# Patient Record
Sex: MALE | Race: WHITE | URBAN - METROPOLITAN AREA
[De-identification: names, ages, dates, MRNs, and addresses within clinical notes are randomized per-mention and may not be internally consistent; named-entity substitution may affect disease eponyms.]

---

## 2017-03-20 ENCOUNTER — INPATIENT (INPATIENT)
Facility: HOSPITAL | Age: 67
LOS: 0 days | Discharge: HOME | DRG: 310 | End: 2017-03-21
Attending: INTERNAL MEDICINE | Admitting: INTERNAL MEDICINE
Payer: COMMERCIAL

## 2017-03-20 VITALS
DIASTOLIC BLOOD PRESSURE: 83 MMHG | HEART RATE: 94 BPM | RESPIRATION RATE: 20 BRPM | WEIGHT: 130.07 LBS | TEMPERATURE: 98 F | OXYGEN SATURATION: 98 % | SYSTOLIC BLOOD PRESSURE: 135 MMHG

## 2017-03-20 DIAGNOSIS — Z02.9 ENCOUNTER FOR ADMINISTRATIVE EXAMINATIONS, UNSPECIFIED: ICD-10-CM

## 2017-03-20 DIAGNOSIS — E78.00 PURE HYPERCHOLESTEROLEMIA, UNSPECIFIED: ICD-10-CM

## 2017-03-20 DIAGNOSIS — I48.91 UNSPECIFIED ATRIAL FIBRILLATION: ICD-10-CM

## 2017-03-20 DIAGNOSIS — Z41.8 ENCOUNTER FOR OTHER PROCEDURES FOR PURPOSES OTHER THAN REMEDYING HEALTH STATE: ICD-10-CM

## 2017-03-20 DIAGNOSIS — E03.9 HYPOTHYROIDISM, UNSPECIFIED: ICD-10-CM

## 2017-03-20 DIAGNOSIS — R63.8 OTHER SYMPTOMS AND SIGNS CONCERNING FOOD AND FLUID INTAKE: ICD-10-CM

## 2017-03-20 LAB
ALBUMIN SERPL ELPH-MCNC: 3.7 G/DL — SIGNIFICANT CHANGE UP (ref 3.4–5)
ALP SERPL-CCNC: 66 U/L — SIGNIFICANT CHANGE UP (ref 40–120)
ALT FLD-CCNC: 34 U/L — SIGNIFICANT CHANGE UP (ref 12–42)
ANION GAP SERPL CALC-SCNC: 9 MMOL/L — SIGNIFICANT CHANGE UP (ref 9–16)
APTT BLD: 33.4 SEC — SIGNIFICANT CHANGE UP (ref 27.5–37.4)
APTT BLD: 81.3 SEC — HIGH (ref 27.5–37.4)
AST SERPL-CCNC: 19 U/L — SIGNIFICANT CHANGE UP (ref 15–37)
BASOPHILS NFR BLD AUTO: 0.3 % — SIGNIFICANT CHANGE UP (ref 0–2)
BILIRUB SERPL-MCNC: 0.7 MG/DL — SIGNIFICANT CHANGE UP (ref 0.2–1.2)
BUN SERPL-MCNC: 21 MG/DL — SIGNIFICANT CHANGE UP (ref 7–23)
CALCIUM SERPL-MCNC: 9.9 MG/DL — SIGNIFICANT CHANGE UP (ref 8.5–10.5)
CHLORIDE SERPL-SCNC: 103 MMOL/L — SIGNIFICANT CHANGE UP (ref 96–108)
CK MB CFR SERPL CALC: <1 NG/ML — SIGNIFICANT CHANGE UP (ref 0.5–3.6)
CK MB CFR SERPL CALC: <1 NG/ML — SIGNIFICANT CHANGE UP (ref 0.5–3.6)
CK SERPL-CCNC: 26 U/L — LOW (ref 39–308)
CK SERPL-CCNC: 40 U/L — SIGNIFICANT CHANGE UP (ref 39–308)
CO2 SERPL-SCNC: 27 MMOL/L — SIGNIFICANT CHANGE UP (ref 22–31)
CREAT SERPL-MCNC: 0.94 MG/DL — SIGNIFICANT CHANGE UP (ref 0.5–1.3)
EOSINOPHIL NFR BLD AUTO: 1.5 % — SIGNIFICANT CHANGE UP (ref 0–6)
EXTRA SST TUBE: SIGNIFICANT CHANGE UP
GLUCOSE SERPL-MCNC: 98 MG/DL — SIGNIFICANT CHANGE UP (ref 70–99)
HBA1C BLD-MCNC: 5.9 % — HIGH (ref 4.8–5.6)
HCT VFR BLD CALC: 49.2 % — SIGNIFICANT CHANGE UP (ref 39–50)
HGB BLD-MCNC: 16.8 G/DL — SIGNIFICANT CHANGE UP (ref 13–17)
INR BLD: 1.19 — HIGH (ref 0.88–1.16)
LYMPHOCYTES # BLD AUTO: 21.9 % — SIGNIFICANT CHANGE UP (ref 13–44)
MAGNESIUM SERPL-MCNC: 2.3 MG/DL — SIGNIFICANT CHANGE UP (ref 1.6–2.4)
MCHC RBC-ENTMCNC: 27.9 PG — SIGNIFICANT CHANGE UP (ref 27–34)
MCHC RBC-ENTMCNC: 34.1 G/DL — SIGNIFICANT CHANGE UP (ref 32–36)
MCV RBC AUTO: 81.7 FL — SIGNIFICANT CHANGE UP (ref 80–100)
MONOCYTES NFR BLD AUTO: 10.2 % — SIGNIFICANT CHANGE UP (ref 2–14)
NEUTROPHILS NFR BLD AUTO: 66.1 % — SIGNIFICANT CHANGE UP (ref 43–77)
PHOSPHATE SERPL-MCNC: 3.4 MG/DL — SIGNIFICANT CHANGE UP (ref 2.5–4.5)
PLATELET # BLD AUTO: 319 K/UL — SIGNIFICANT CHANGE UP (ref 150–400)
POTASSIUM SERPL-MCNC: 4.3 MMOL/L — SIGNIFICANT CHANGE UP (ref 3.5–5.3)
POTASSIUM SERPL-SCNC: 4.3 MMOL/L — SIGNIFICANT CHANGE UP (ref 3.5–5.3)
PROT SERPL-MCNC: 7.9 G/DL — SIGNIFICANT CHANGE UP (ref 6.4–8.2)
PROTHROM AB SERPL-ACNC: 13.2 SEC — HIGH (ref 10–13.1)
RBC # BLD: 6.02 M/UL — HIGH (ref 4.2–5.8)
RBC # FLD: 12.9 % — SIGNIFICANT CHANGE UP (ref 10.3–16.9)
SODIUM SERPL-SCNC: 139 MMOL/L — SIGNIFICANT CHANGE UP (ref 135–145)
T4 AB SER-ACNC: 17.18 UG/DL — HIGH (ref 3.17–11.72)
TROPONIN I SERPL-MCNC: <0.015 NG/ML — SIGNIFICANT CHANGE UP (ref 0.01–0.04)
TROPONIN I SERPL-MCNC: <0.015 NG/ML — SIGNIFICANT CHANGE UP (ref 0.01–0.04)
TSH SERPL-MCNC: 0.01 UIU/ML — LOW (ref 0.35–4.94)
WBC # BLD: 9.3 K/UL — SIGNIFICANT CHANGE UP (ref 3.8–10.5)
WBC # FLD AUTO: 9.3 K/UL — SIGNIFICANT CHANGE UP (ref 3.8–10.5)

## 2017-03-20 PROCEDURE — 93010 ELECTROCARDIOGRAM REPORT: CPT | Mod: 59

## 2017-03-20 PROCEDURE — 93010 ELECTROCARDIOGRAM REPORT: CPT | Mod: 77,59

## 2017-03-20 PROCEDURE — 99291 CRITICAL CARE FIRST HOUR: CPT | Mod: 25

## 2017-03-20 PROCEDURE — 93306 TTE W/DOPPLER COMPLETE: CPT | Mod: 26

## 2017-03-20 PROCEDURE — 71010: CPT | Mod: 26

## 2017-03-20 PROCEDURE — 99255 IP/OBS CONSLTJ NEW/EST HI 80: CPT

## 2017-03-20 RX ORDER — HEPARIN SODIUM 5000 [USP'U]/ML
2000 INJECTION INTRAVENOUS; SUBCUTANEOUS EVERY 6 HOURS
Qty: 0 | Refills: 0 | Status: DISCONTINUED | OUTPATIENT
Start: 2017-03-20 | End: 2017-03-20

## 2017-03-20 RX ORDER — SODIUM CHLORIDE 9 MG/ML
1000 INJECTION INTRAMUSCULAR; INTRAVENOUS; SUBCUTANEOUS ONCE
Qty: 0 | Refills: 0 | Status: COMPLETED | OUTPATIENT
Start: 2017-03-20 | End: 2017-03-20

## 2017-03-20 RX ORDER — HEPARIN SODIUM 5000 [USP'U]/ML
4500 INJECTION INTRAVENOUS; SUBCUTANEOUS ONCE
Qty: 0 | Refills: 0 | Status: COMPLETED | OUTPATIENT
Start: 2017-03-20 | End: 2017-03-20

## 2017-03-20 RX ORDER — PANTOPRAZOLE SODIUM 20 MG/1
40 TABLET, DELAYED RELEASE ORAL
Qty: 0 | Refills: 0 | Status: DISCONTINUED | OUTPATIENT
Start: 2017-03-20 | End: 2017-03-21

## 2017-03-20 RX ORDER — HEPARIN SODIUM 5000 [USP'U]/ML
1150 INJECTION INTRAVENOUS; SUBCUTANEOUS
Qty: 25000 | Refills: 0 | Status: DISCONTINUED | OUTPATIENT
Start: 2017-03-20 | End: 2017-03-21

## 2017-03-20 RX ORDER — HEPARIN SODIUM 5000 [USP'U]/ML
1200 INJECTION INTRAVENOUS; SUBCUTANEOUS
Qty: 25000 | Refills: 0 | Status: DISCONTINUED | OUTPATIENT
Start: 2017-03-20 | End: 2017-03-20

## 2017-03-20 RX ORDER — ASPIRIN/CALCIUM CARB/MAGNESIUM 324 MG
81 TABLET ORAL DAILY
Qty: 0 | Refills: 0 | Status: DISCONTINUED | OUTPATIENT
Start: 2017-03-20 | End: 2017-03-21

## 2017-03-20 RX ORDER — HEPARIN SODIUM 5000 [USP'U]/ML
1100 INJECTION INTRAVENOUS; SUBCUTANEOUS
Qty: 25000 | Refills: 0 | Status: DISCONTINUED | OUTPATIENT
Start: 2017-03-20 | End: 2017-03-20

## 2017-03-20 RX ORDER — ATORVASTATIN CALCIUM 80 MG/1
20 TABLET, FILM COATED ORAL AT BEDTIME
Qty: 0 | Refills: 0 | Status: DISCONTINUED | OUTPATIENT
Start: 2017-03-20 | End: 2017-03-21

## 2017-03-20 RX ORDER — SODIUM CHLORIDE 9 MG/ML
1000 INJECTION INTRAMUSCULAR; INTRAVENOUS; SUBCUTANEOUS
Qty: 0 | Refills: 0 | Status: DISCONTINUED | OUTPATIENT
Start: 2017-03-20 | End: 2017-03-20

## 2017-03-20 RX ORDER — HEPARIN SODIUM 5000 [USP'U]/ML
4500 INJECTION INTRAVENOUS; SUBCUTANEOUS EVERY 6 HOURS
Qty: 0 | Refills: 0 | Status: DISCONTINUED | OUTPATIENT
Start: 2017-03-20 | End: 2017-03-20

## 2017-03-20 RX ORDER — HEPARIN SODIUM 5000 [USP'U]/ML
INJECTION INTRAVENOUS; SUBCUTANEOUS
Qty: 25000 | Refills: 0 | Status: DISCONTINUED | OUTPATIENT
Start: 2017-03-20 | End: 2017-03-20

## 2017-03-20 RX ORDER — LEVOTHYROXINE SODIUM 125 MCG
150 TABLET ORAL DAILY
Qty: 0 | Refills: 0 | Status: DISCONTINUED | OUTPATIENT
Start: 2017-03-21 | End: 2017-03-21

## 2017-03-20 RX ADMIN — HEPARIN SODIUM 4500 UNIT(S): 5000 INJECTION INTRAVENOUS; SUBCUTANEOUS at 13:25

## 2017-03-20 RX ADMIN — PANTOPRAZOLE SODIUM 40 MILLIGRAM(S): 20 TABLET, DELAYED RELEASE ORAL at 19:50

## 2017-03-20 RX ADMIN — SODIUM CHLORIDE 125 MILLILITER(S): 9 INJECTION INTRAMUSCULAR; INTRAVENOUS; SUBCUTANEOUS at 14:15

## 2017-03-20 RX ADMIN — HEPARIN SODIUM 11 UNIT(S)/HR: 5000 INJECTION INTRAVENOUS; SUBCUTANEOUS at 21:29

## 2017-03-20 RX ADMIN — HEPARIN SODIUM 1100 UNIT(S)/HR: 5000 INJECTION INTRAVENOUS; SUBCUTANEOUS at 13:27

## 2017-03-20 RX ADMIN — Medication 81 MILLIGRAM(S): at 19:51

## 2017-03-20 RX ADMIN — SODIUM CHLORIDE 2000 MILLILITER(S): 9 INJECTION INTRAMUSCULAR; INTRAVENOUS; SUBCUTANEOUS at 12:58

## 2017-03-20 RX ADMIN — ATORVASTATIN CALCIUM 20 MILLIGRAM(S): 80 TABLET, FILM COATED ORAL at 21:31

## 2017-03-20 NOTE — ED ADULT TRIAGE NOTE - CHIEF COMPLAINT QUOTE
"My doctor said I have Afib and come straight to the ER." c/o sob and quotes "I just climbed as 1 flight of stairs." Pt denies any chest pain or dizziness.

## 2017-03-20 NOTE — ED PROVIDER NOTE - OBJECTIVE STATEMENT
68 yo male h/o hld,  hypothyroidism on meds w/o recent changes sent by his cardiologist Dr Staley and found to have rapid afib 160's in the office. Pt went to cardiologist for routine visit today.  Pt c/o fatigue x ~ 1 mo, noted sob w climbing stairs today but no h/o de guzman.  No cp, palpitations.  No h/o afib.  Per pt, hr 90's on pulse ox machine at home over the weekend.  No other complaint.

## 2017-03-20 NOTE — CONSULT NOTE ADULT - ATTENDING COMMENTS
A Fib of unknown duration most likely due to iatrogenic hyperthyroidism.  Hold Synthroid and have Endocrine consult(Dennys or Keila).  RAMON and DC CV in light of reduced EF. Pt may not convert due to ongoing hyperthyroidism.  Beta blocker would be better due to hyperthyroidism(over a Ca++ channel blocker) for rate control.  Possible DC on Xarelto(Wife takes it as well as she also has A Fib)    Exam notable for severe scoliosis

## 2017-03-20 NOTE — H&P ADULT - PROBLEM SELECTOR PLAN 1
Pt presents with new onset Afib, unknown time of onset. 's in ED s/p Cardizem 20 mg IV and 30 mg PO with improvement in HR to 90's.  - TSH very low 0.009- likely over medicated on Synthroid and possible etiology of rapid afib. f/u Free T3, T4.  On synthroid 200 mcg at home. Will reduce to 150 mcg daily.  - Trops negative x 1 and patient chest pain free. Continue to trend cardiac enzymes to r/o ischemia.   - f/u Echo to assess EF, regional wall motion abnormalities or significant valvular disease  - EP consulted; give new onset Afib, patient will likely benefit from DCCV  - Continue rate control with Cardizem 30 mg every 6 hours  - [id number] 1.  Given possibility of DCCV, continue with heparin gtt. f/u PTT (goal 60-80). Guiac negative in ED.  Patient is a good candidate for NOAC if insurance covers the drug.

## 2017-03-20 NOTE — ED PROVIDER NOTE - CONSTITUTIONAL, MLM
normal... Well appearing, thin, awake, alert, oriented to person, place, time/situation and in no apparent distress.

## 2017-03-20 NOTE — CONSULT NOTE ADULT - SUBJECTIVE AND OBJECTIVE BOX
CHIEF COMPLAINT: ESPINOZA     HISTORY OF PRESENT ILLNESS: 68 y/o M with h/o severe scoliosis, HTN, HLD who presented to cardiologist (Dr. Staley) today for evaluation of worsening ESPINOZA x 2-3 months with minimal exertion.  He also endorses fatigue and intermittent palpitations over the last few weeks.  He denies any active chest discomfort, SOB at rest, orthopnea, presyncope or syncope.  Found to be in rapid AF and sent to ED.  Received 1L NS bolus, Cardizem 20 mg IV x1 and Cardizem 30 mg PO with improvement in HR 90-110s.    PAST MEDICAL & SURGICAL HISTORY:  Scoliosis  Hypercholesteremia  Hypothyroid        PERTINENT DIAGNOSTIC TESTING:    [ ] Echocardiogram:      Allergies    Aleve (Unknown)  Sudafed (Unknown)    Intolerances    	    MEDICATIONS:  diltiazem    Tablet 30milliGRAM(s) Oral every 6 hours    pantoprazole    Tablet 40milliGRAM(s) Oral before breakfast    atorvastatin 20milliGRAM(s) Oral at bedtime    heparin  Infusion 1100Unit(s)/Hr IV Continuous <Continuous>      FAMILY HISTORY:  Father  MI 55    SOCIAL HISTORY:    [x ] Non-smoker  [ x] Social Alcohol  No illicits        REVIEW OF SYSTEMS:        [x ] All others negative except for as noted in HPI      PHYSICAL EXAM:  T(C): 36, Max: 36.4 ( @ 12:31)  HR: 110 (79 - 175)  BP: 102/71 (102/71 - 152/87)  RR: 18 (18 - 20)  SpO2: 97% (96% - 98%)  Wt(kg): --  I&O's Summary      TELEMETRY: 	  AF @ ~ 110 bpm  ECG: AF with RVR    Appearance: Normal	  HEENT:   Normal oral mucosa, PERRL, EOMI	  Cardiovascular: s1s2 irreg irreg tachy  Respiratory: Lungs CTA b/l, diminished RLL	  Gastrointestinal:  Soft, Non-tender, + BS	  Neurologic: A&O x 3, Non-focal  Extremities: Normal range of motion, No clubbing, cyanosis or edema  Vascular: Peripheral pulses palpable 2+ bilaterally    	  LABS:	 	    CARDIAC MARKERS:  Troponin I, Serum: <0.015 ng/mL ( @ 12:52)                                  16.8   9.3   )-----------( 319      ( 20 Mar 2017 12:52 )             49.2     20 Mar 2017 12:52    139    |  103    |  21     ----------------------------<  98     4.3     |  27     |  0.94     Ca    9.9        20 Mar 2017 12:52  Phos  3.4       20 Mar 2017 12:52  Mg     2.3       20 Mar 2017 12:52    TPro  7.9    /  Alb  3.7    /  TBili  0.7    /  DBili  x      /  AST  19     /  ALT  34     /  AlkPhos  66     20 Mar 2017 12:52    proBNP:   Lipid Profile:   HgA1c: Hemoglobin A1C, Whole Blood: 5.9 % ( @ 12:52)    TSH: Thyroid Stimulating Hormone, Serum: 0.009 uIU/mL ( @ 12:52)      ASSESSMENT/PLAN: 	  68 y/o M with newly diagnosed symptomatic atrial fibrillation with RVR  - NPO after MN for RAMON/DCCV  - Continue rate control as you are   - Agree with reduced dose Levothyroxine given TSH low  - Pt. verbalizes intent to f/u with Dr. Layton at [facility] as his wife has established arrhythmia care with her  - Recommend Xarelto 20 mg PO daily for stroke prophylaxis

## 2017-03-20 NOTE — H&P ADULT - NSHPLABSRESULTS_GEN_ALL_CORE
LABS:                          16.8   9.3   )-----------( 319      ( 20 Mar 2017 12:52 )             49.2                              20 Mar 2017 12:52    139    |  103    |  21     ----------------------------<  98     4.3     |  27     |  0.94     Ca    9.9        20 Mar 2017 12:52  Phos  3.4       20 Mar 2017 12:52  Mg     2.3       20 Mar 2017 12:52    TPro  7.9    /  Alb  3.7    /  TBili  0.7    /  DBili  x      /  AST  19     /  ALT  34     /  AlkPhos  66     20 Mar 2017 12:52    LIVER FUNCTIONS - ( 20 Mar 2017 12:52 )  Alb: 3.7 g/dL / Pro: 7.9 g/dL / ALK PHOS: 66 U/L / ALT: 34 U/L / AST: 19 U/L / GGT: x                                 PT/INR - ( 20 Mar 2017 12:52 )   PT: 13.2 sec;   INR: 1.19          PTT - ( 20 Mar 2017 12:52 )  PTT:33.4 sec  CAPILLARY BLOOD GLUCOSE    CARDIAC MARKERS ( 20 Mar 2017 12:52 )  <0.015 ng/mL / x     / 40 U/L / x     / <1.0 ng/mL            Aleve (Unknown)  Sudafed (Unknown)    MEDICATIONS  (STANDING):  atorvastatin 20milliGRAM(s) Oral at bedtime  diltiazem    Tablet 30milliGRAM(s) Oral every 6 hours  pantoprazole    Tablet 40milliGRAM(s) Oral before breakfast  heparin  Infusion 1100Unit(s)/Hr IV Continuous <Continuous>    MEDICATIONS  (PRN):

## 2017-03-20 NOTE — ED PROVIDER NOTE - MEDICAL DECISION MAKING DETAILS
Pt sent for rapid afib w rapid afib on arrival. Pt given cardizem iv and po w improved hr.  Pt discussed w his cardiologist, Dr. Staley - merlin to hospitalist cardiology.  Heparin started. Enzymes neg. Pt discussed w Dr Epperson and agrees to admit.

## 2017-03-20 NOTE — ED PROVIDER NOTE - MUSCULOSKELETAL, MLM
no c/c/e/ttp bilat le, Spine appears normal, range of motion is not limited, no muscle or joint tenderness

## 2017-03-20 NOTE — ED PROVIDER NOTE - CRITICAL CARE PROVIDED
interpretation of diagnostic studies/consult w/ pt's family directly relating to pts condition/documentation/direct patient care (not related to procedure)/additional history taking/consultation with other physicians

## 2017-03-20 NOTE — H&P ADULT - NSHPPHYSICALEXAM_GEN_ALL_CORE
VITAL SIGNS:  T(C): 36, Max: 36.4 (03-20 @ 12:31)  HR: 110 (79 - 175)  BP: 102/71 (102/71 - 152/87)  RR: 18 (18 - 20)  SpO2: 97% (96% - 98%)  Wt(kg): --    I&O's Summary        PHYSICAL EXAM:    General: A/ox 3, No acute Distress, Severe scoliosis  Neck: Supple, NO JVD  Cardiac: S1 S2, irregularly irregular, tachycardic  Pulmonary: CTAB, Breathing unlabored, No Rhonchi/Rales/Wheezing  Abdomen: Soft, Non -tender, +BS x 4 quads  Extremities: No Rashes, No edema  Neuro: A/o x 3, No focal deficits VITAL SIGNS:  T(C): 36, Max: 36.4 (03-20 @ 12:31)  HR: 110 (79 - 175)  BP: 102/71 (102/71 - 152/87)  RR: 18 (18 - 20)  SpO2: 97% (96% - 98%)  Wt(kg): --    I&O's Summary        PHYSICAL EXAM:    General: A/ox 3, No acute Distress, + scoliosis  Neck: Supple, NO JVD  Cardiac: S1 S2, irregularly irregular, tachycardic  Pulmonary: CTAB, Breathing unlabored, No Rhonchi/Rales/Wheezing  Abdomen: Soft, Non -tender, +BS x 4 quads  Extremities: No Rashes, No edema  Neuro: A/o x 3, No focal deficits

## 2017-03-20 NOTE — H&P ADULT - NSHPREVIEWOFSYSTEMS_GEN_ALL_CORE
GeneraL: + Fatigue  Pulmonary: ESPINOZA with walking one flight of stairs  Cardiac: Denies chest pain, +intermittent palpitations, ESPINOZA  GI: Reports frequently BM's intermittently last week, no diarrhea, no melena, BRBPR  Extremities: Denies claudication, lower extremity weakness.

## 2017-03-20 NOTE — H&P ADULT - HISTORY OF PRESENT ILLNESS
66 y/o male with pmhx of Severe Scoliosis with pulmonary limitations, Hypothyroidism and Hyperlipidemia was sent in by his Cardiologist Dr. Staley for  new onset Afib. Patient states he has been feeling extreme fatigue and ESPINOZA with one flight of stairs for the last couple of months but progressively worsening in the last 3 weeks. He also c/o intermittent palpitations but denies chest pain, dizziness, orthopnea, PND, recent illness, travel.  Denies any recent changes in medications. Denies Tobacco dependance, ETOH or illicit drugs.  He was seen by Dr. Staley in the office today and found to be in rapid Afib for which he was sent to the ED.  Family history significant for Father dying of MI at age 55.  Last ischemic evaluation was last year which was a nuclear stress test reportedly normal. 66 y/o male with pmhx of Severe Scoliosis with pulmonary limitations, Hypothyroidism and Hyperlipidemia was sent in by his Cardiologist Dr. Staley for  new onset Afib. Patient states he has been feeling extreme fatigue and ESPINOZA with one flight of stairs for the last couple of months but progressively worsening in the last 3 weeks. He also c/o intermittent palpitations but denies chest pain, dizziness, orthopnea, PND, recent illness, travel.  Denies any recent changes in medications. Denies Tobacco dependance, ETOH or illicit drugs.  He was seen by Dr. Staley in the office today and found to be in rapid Afib for which he was sent to the ED.  Family history significant for Father dying of MI at age 55.  Last ischemic evaluation was last year which was a nuclear stress test reportedly normal.     In the Ed, /71 to 123/83, 's initially improved to 90s' after Cardizem, HR 16-18, O2 sat 96% on RA  Patient was given 1 liter fluid bolus, Cardizem 20 mg IV x 1 and 30 mg PO x 1. 66 y/o male with pmhx of Scoliosis  Hypothyroidism and Hyperlipidemia was sent in by his Cardiologist Dr. Staley for  new onset Afib. Patient states he has been feeling extreme fatigue and ESPINOZA with one flight of stairs for the last couple of months but progressively worsening in the last 3 weeks. He also c/o intermittent palpitations but denies chest pain, dizziness, orthopnea, PND, recent illness, travel.  Denies any recent changes in medications. Denies Tobacco dependance, ETOH or illicit drugs.  He was seen by Dr. Staley in the office today and found to be in rapid Afib for which he was sent to the ED.  Family history significant for Father dying of MI at age 55.  Last ischemic evaluation was last year which was a nuclear stress test reportedly normal.     In the Ed, /71 to 123/83, 's initially improved to 90s' after Cardizem, HR 16-18, O2 sat 96% on RA  Patient was given 1 liter fluid bolus, Cardizem 20 mg IV x 1 and 30 mg PO x 1.

## 2017-03-20 NOTE — ED ADULT NURSE NOTE - OBJECTIVE STATEMENT
67 year old male generalized weakness x weeks and sob when climbing up the stairs. Pt reported was at his cardiologist when EKG was done and was in new onset of Afib. Denies chest pain or dizziness at he moment. 67 year old male generalized weakness x weeks and sob when climbing up the stairs. Pt reported was at his cardiologist when EKG was done and was in new onset of Afib. Denies chest pain or dizziness at the moment.

## 2017-03-20 NOTE — H&P ADULT - ASSESSMENT
66 y/o male with pmhx of Scoliosis, Hypothyroidism and Hypertension presents with 3 weeks of fatigue and ESPINOZA; found to be in new onset Afib.

## 2017-03-21 ENCOUNTER — TRANSCRIPTION ENCOUNTER (OUTPATIENT)
Age: 67
End: 2017-03-21

## 2017-03-21 VITALS — RESPIRATION RATE: 16 BRPM | SYSTOLIC BLOOD PRESSURE: 112 MMHG | DIASTOLIC BLOOD PRESSURE: 77 MMHG | HEART RATE: 98 BPM

## 2017-03-21 LAB
APTT BLD: 40.4 SEC — HIGH (ref 27.5–37.4)
T3 SERPL-MCNC: 163 NG/DL — SIGNIFICANT CHANGE UP (ref 80–200)

## 2017-03-21 PROCEDURE — 96375 TX/PRO/DX INJ NEW DRUG ADDON: CPT

## 2017-03-21 PROCEDURE — 85730 THROMBOPLASTIN TIME PARTIAL: CPT

## 2017-03-21 PROCEDURE — 84443 ASSAY THYROID STIM HORMONE: CPT

## 2017-03-21 PROCEDURE — 80061 LIPID PANEL: CPT

## 2017-03-21 PROCEDURE — 71045 X-RAY EXAM CHEST 1 VIEW: CPT

## 2017-03-21 PROCEDURE — 84100 ASSAY OF PHOSPHORUS: CPT

## 2017-03-21 PROCEDURE — 83735 ASSAY OF MAGNESIUM: CPT

## 2017-03-21 PROCEDURE — 92960 CARDIOVERSION ELECTRIC EXT: CPT

## 2017-03-21 PROCEDURE — 93312 ECHO TRANSESOPHAGEAL: CPT

## 2017-03-21 PROCEDURE — 84484 ASSAY OF TROPONIN QUANT: CPT

## 2017-03-21 PROCEDURE — 93320 DOPPLER ECHO COMPLETE: CPT | Mod: 26

## 2017-03-21 PROCEDURE — 85025 COMPLETE CBC W/AUTO DIFF WBC: CPT

## 2017-03-21 PROCEDURE — 84436 ASSAY OF TOTAL THYROXINE: CPT

## 2017-03-21 PROCEDURE — 84481 FREE ASSAY (FT-3): CPT

## 2017-03-21 PROCEDURE — 84439 ASSAY OF FREE THYROXINE: CPT

## 2017-03-21 PROCEDURE — 83036 HEMOGLOBIN GLYCOSYLATED A1C: CPT

## 2017-03-21 PROCEDURE — 99291 CRITICAL CARE FIRST HOUR: CPT | Mod: 25

## 2017-03-21 PROCEDURE — 82550 ASSAY OF CK (CPK): CPT

## 2017-03-21 PROCEDURE — 80053 COMPREHEN METABOLIC PANEL: CPT

## 2017-03-21 PROCEDURE — 96374 THER/PROPH/DIAG INJ IV PUSH: CPT

## 2017-03-21 PROCEDURE — 93005 ELECTROCARDIOGRAM TRACING: CPT

## 2017-03-21 PROCEDURE — 93312 ECHO TRANSESOPHAGEAL: CPT | Mod: 26

## 2017-03-21 PROCEDURE — 84480 ASSAY TRIIODOTHYRONINE (T3): CPT

## 2017-03-21 PROCEDURE — 85027 COMPLETE CBC AUTOMATED: CPT

## 2017-03-21 PROCEDURE — 82553 CREATINE MB FRACTION: CPT

## 2017-03-21 PROCEDURE — 85610 PROTHROMBIN TIME: CPT

## 2017-03-21 PROCEDURE — 93010 ELECTROCARDIOGRAM REPORT: CPT

## 2017-03-21 PROCEDURE — 36415 COLL VENOUS BLD VENIPUNCTURE: CPT

## 2017-03-21 PROCEDURE — 93306 TTE W/DOPPLER COMPLETE: CPT

## 2017-03-21 PROCEDURE — 93325 DOPPLER ECHO COLOR FLOW MAPG: CPT | Mod: 26

## 2017-03-21 RX ORDER — LEVOTHYROXINE SODIUM 125 MCG
1 TABLET ORAL
Qty: 0 | Refills: 0 | COMMUNITY

## 2017-03-21 RX ORDER — HEPARIN SODIUM 5000 [USP'U]/ML
900 INJECTION INTRAVENOUS; SUBCUTANEOUS
Qty: 25000 | Refills: 0 | Status: DISCONTINUED | OUTPATIENT
Start: 2017-03-21 | End: 2017-03-21

## 2017-03-21 RX ORDER — RIVAROXABAN 15 MG-20MG
1 KIT ORAL
Qty: 30 | Refills: 0 | OUTPATIENT
Start: 2017-03-21 | End: 2017-04-20

## 2017-03-21 RX ORDER — LEVOTHYROXINE SODIUM 125 MCG
1 TABLET ORAL
Qty: 30 | Refills: 0 | OUTPATIENT
Start: 2017-03-21 | End: 2017-04-20

## 2017-03-21 RX ORDER — METOPROLOL TARTRATE 50 MG
1 TABLET ORAL
Qty: 30 | Refills: 0 | OUTPATIENT
Start: 2017-03-21 | End: 2017-04-20

## 2017-03-21 RX ORDER — RIVAROXABAN 15 MG-20MG
20 KIT ORAL
Qty: 0 | Refills: 0 | Status: DISCONTINUED | OUTPATIENT
Start: 2017-03-21 | End: 2017-03-21

## 2017-03-21 RX ORDER — RIVAROXABAN 15 MG-20MG
1 KIT ORAL
Qty: 0 | Refills: 0 | COMMUNITY
Start: 2017-03-21

## 2017-03-21 RX ADMIN — Medication 150 MICROGRAM(S): at 07:23

## 2017-03-21 RX ADMIN — Medication 81 MILLIGRAM(S): at 15:34

## 2017-03-21 RX ADMIN — PANTOPRAZOLE SODIUM 40 MILLIGRAM(S): 20 TABLET, DELAYED RELEASE ORAL at 07:23

## 2017-03-21 RX ADMIN — RIVAROXABAN 20 MILLIGRAM(S): KIT at 16:32

## 2017-03-21 NOTE — PROGRESS NOTE ADULT - PROBLEM SELECTOR PLAN 2
TSH very low.  f/u free T3, T4  - Dr. Noyola consulted; per recs, hold Synthroid for now and patient can follow up with in the office next Wednesday, March 29th at 4 pm at which point it'll likely be restarted at a lower dose. If patient cannot follow up on this date, will d/c on Synthroid 137 mcg daily per recs.

## 2017-03-21 NOTE — DISCHARGE NOTE ADULT - PLAN OF CARE
You were admitted for an abnormal heart rhythm called atrial fibrillation or Afib. In people with Afib, the electrical signals that control the heart rate are abnormal. As a result, the top two chambers of the heart stop pumping effectively. and  a small amount of blood gets left behind. As the blood pools, it can start to form clots and these clots can travel through the blood vessels and cause strokes. You were evaluated by the Electrophysiology team and they tried to attempt a procedure called cardioversion to restore normal rhythm.  However, this did not work and you reverted back to Afib within a short period of time. You have been started on a medication called Metoprolol to help control the heart rate and a blood thinner called Xarelto prevent the risk of stroke; please take them as prescribed.  Follow up with Dr. Epperson or your own Cardiologist within one week of discharge. Your Afib is driven by overactive thyroid as you were overmedicated with Synthroid.  Please follow the directions below. You were overtreated for hypothyroidism and your thyroid functions tests very abnormal.  You were evaluated by an Endocrinologist and they made the recommendation to hold your levothyroxine for one week and follow up with Dr. Noyola on Wednesday, March 29th.  She will likely restart a lower dose of Synthroid 137 mcg at that time.  The prescription has been sent to your pharmacy but you should discuss with her before starting this new dose. Heart rate control and prevent risk of stroke. You were admitted for an abnormal heart rhythm called atrial fibrillation or Afib. In people with Afib, the electrical signals that control the heart rate are abnormal. As a result, the top two chambers of the heart stop pumping effectively. and  a small amount of blood gets left behind. As the blood pools, it can start to form clots and these clots can travel through the blood vessels and cause strokes. You were evaluated by the Electrophysiology team and they tried to attempt a procedure called cardioversion to restore normal rhythm.  However, this did not work and you reverted back to Afib within a short period of time. You have been started on a medication called Metoprolol to help control the heart rate and a blood thinner called Xarelto prevent the risk of stroke; please take them as prescribed.  Follow up with Dr. Epperson or your own Cardiologist within one week of discharge. Your Afib is driven by overactive thyroid as you were overmedicated with Levothyroxine.  Please follow the directions below.

## 2017-03-21 NOTE — DISCHARGE NOTE ADULT - MEDICATION SUMMARY - MEDICATIONS TO TAKE
I will START or STAY ON the medications listed below when I get home from the hospital:    rivaroxaban 20 mg oral tablet  -- 1 tab(s) by mouth once a day (before a meal)  -- Indication: For Atrial fibrillation    atorvastatin 20 mg oral tablet  -- 1 tab(s) by mouth once a day  -- Indication: For Hypercholesteremia    Toprol-XL 50 mg oral tablet, extended release  -- 1 tab(s) by mouth once a day. Start on 3/21.  -- It is very important that you take or use this exactly as directed.  Do not skip doses or discontinue unless directed by your doctor.  May cause drowsiness.  Alcohol may intensify this effect.  Use care when operating dangerous machinery.  Some non-prescription drugs may aggravate your condition.  Read all labels carefully.  If a warning appears, check with your doctor before taking.  Swallow whole.  Do not crush.  Take with food or milk.  This drug may impair the ability to drive or operate machinery.  Use care until you become familiar with its effects.    -- Indication: For Atrial fibrillation    levothyroxine 137 mcg (0.137 mg) oral tablet  -- 1 tab(s) by mouth once a day. Please start on 3/29 after discussing with Dr. Noyola.  -- It is very important that you take or use this exactly as directed.  Do not skip doses or discontinue unless directed by your doctor.  Medication should be taken with plenty of water.  Some non-prescription drugs may aggravate your condition.  Read all labels carefully.  If a warning appears, check with your doctor before taking.  Take medication on an empty stomach 1 hour before or 2 to 3 hours after a meal unless otherwise directed by your doctor.    -- Indication: For Hypothyroid I will START or STAY ON the medications listed below when I get home from the hospital:    rivaroxaban 20 mg oral tablet  -- 1 tab(s) by mouth once a day (before a meal)  -- Indication: For Atrial fibrillation    atorvastatin 20 mg oral tablet  -- 1 tab(s) by mouth once a day  -- Indication: For Hypercholesteremia    Toprol-XL 50 mg oral tablet, extended release  -- 1 tab(s) by mouth once a day. Start on 3/22.  -- It is very important that you take or use this exactly as directed.  Do not skip doses or discontinue unless directed by your doctor.  May cause drowsiness.  Alcohol may intensify this effect.  Use care when operating dangerous machinery.  Some non-prescription drugs may aggravate your condition.  Read all labels carefully.  If a warning appears, check with your doctor before taking.  Swallow whole.  Do not crush.  Take with food or milk.  This drug may impair the ability to drive or operate machinery.  Use care until you become familiar with its effects.    -- Indication: For Atrial fibrillation    levothyroxine 137 mcg (0.137 mg) oral tablet  -- 1 tab(s) by mouth once a day. Please start on 3/29 after discussing with Dr. Noyola.  -- It is very important that you take or use this exactly as directed.  Do not skip doses or discontinue unless directed by your doctor.  Medication should be taken with plenty of water.  Some non-prescription drugs may aggravate your condition.  Read all labels carefully.  If a warning appears, check with your doctor before taking.  Take medication on an empty stomach 1 hour before or 2 to 3 hours after a meal unless otherwise directed by your doctor.    -- Indication: For Hypothyroid

## 2017-03-21 NOTE — DISCHARGE NOTE ADULT - PATIENT PORTAL LINK FT
“You can access the Follow[employer] Patient Portal, offered by [facility], by registering with the following website: [email or web address]”

## 2017-03-21 NOTE — DISCHARGE NOTE ADULT - HOSPITAL COURSE
66 y/o male with pmhx of Scoliosis  Hypothyroidism and Hyperlipidemia was sent in by his Cardiologist Dr. Staley for  new onset Afib. Patient states he has been feeling extreme fatigue and ESPINOZA with one flight of stairs for the last couple of months but progressively worsening in the last 3 weeks. He also c/o intermittent palpitations but denies chest pain, dizziness, orthopnea, PND, recent illness, travel.  Denies any recent changes in medications. Denies Tobacco dependance, ETOH or illicit drugs.  He was seen by Dr. Staley in the office today and found to be in rapid Afib for which he was sent to the ED.  Family history significant for Father dying of MI at age 55.  Last ischemic evaluation was last year which was a nuclear stress test reportedly normal.     In the Ed, /71 to 123/83, 's initially improved to 90s' after Cardizem, HR 16-18, O2 sat 96% on RA  Patient was given 1 liter fluid bolus, Cardizem 20 mg IV x 1 and 30 mg PO x 1.    Labs were significant for suppressed TSH 0.009 with elevated T4 likely etiology of rapid afib. Patient was transferred to [facility] and started on Cardizem 30 mg every 6 hours titrated up to 60 mg every 6 hours. Echo showed normal wall motion, no valvular abnormalities, EF 45-50% likely rate related as patient was tachycardic throughout exam.  EP consulted and pt underwent RAMON showing no LA thrombus but DCCV was unsuccessful. Heparin gtt d/c';d and patient transitioned to Xarelto. Endocrine consulted for recommendations on synthroid dosing as patient was overtreated for hypothyroidism. Per recs, synthroid to be held x 1 week and follow up with Dr. Carmina Noyola within one week. 66 y/o male with pmhx of Scoliosis  Hypothyroidism and Hyperlipidemia was sent in by his Cardiologist Dr. Staley for  new onset Afib. Patient states he has been feeling extreme fatigue and ESPINOZA with one flight of stairs for the last couple of months but progressively worsening in the last 3 weeks. He also c/o intermittent palpitations but denies chest pain, dizziness, orthopnea, PND, recent illness, travel.  Denies any recent changes in medications. Denies Tobacco dependance, ETOH or illicit drugs.  He was seen by Dr. Staley in the office today and found to be in rapid Afib for which he was sent to the ED.  Family history significant for Father dying of MI at age 55.  Last ischemic evaluation was last year which was a nuclear stress test reportedly normal.     In the Ed, /71 to 123/83, 's initially improved to 90s' after Cardizem, HR 16-18, O2 sat 96% on RA  Patient was given 1 liter fluid bolus, Cardizem 20 mg IV x 1 and 30 mg PO x 1.    Labs were significant for suppressed TSH 0.009 with elevated T4 likely etiology of rapid afib. Patient was transferred to [facility] and started on Cardizem 30 mg every 6 hours titrated up to 60 mg every 6 hours. Echo showed normal wall motion, no valvular abnormalities, EF 45-50% likely rate related as patient was tachycardic throughout exam.  EP consulted and pt underwent RAMON showing no LA thrombus but DCCV was unsuccessful. Heparin gtt d/c';d and patient transitioned to Xarelto. Transitioned to Toprol Xl 50 mg for rate controlEndocrine consulted for recommendations on synthroid dosing as patient was overtreated for hypothyroidism. Per recs, synthroid to be held x 1 week and follow up with Dr. Carmina Noyola within one week. Patient's HR remained stable in low 90's and was cleared for discharge per Dr. Epperson.

## 2017-03-21 NOTE — DISCHARGE NOTE ADULT - MEDICATION SUMMARY - MEDICATIONS TO STOP TAKING
I will STOP taking the medications listed below when I get home from the hospital:    levothyroxine 200 mcg (0.2 mg) oral tablet  -- 1 tab(s) by mouth once a day

## 2017-03-21 NOTE — DISCHARGE NOTE ADULT - ADDITIONAL INSTRUCTIONS
1.  Follow up with Dr. Epperson or your Cardiologist trista one week of discharge.  Dr. Epperson: [phone]    2.  Follow up with Dr. Carmina Noyola on Wednesday March 29th at 4 pm.  [address] [place] [address]  [phone] 1.  Follow up with Dr. Epperson or your Cardiologist within one week of discharge.  Dr. Epperson: [phone]    2.  Follow up with Dr. Carmina Noyola on Wednesday March 29th at 4 pm.  [address] [place] [address]  [phone]

## 2017-03-21 NOTE — DISCHARGE NOTE ADULT - CARE PROVIDERS DIRECT ADDRESSES
[email or web address],DirectAddress_Unknown,[email or web address]

## 2017-03-21 NOTE — PROGRESS NOTE ADULT - SUBJECTIVE AND OBJECTIVE BOX
CARDIOLOGY NP PROGRESS NOTE    Subjective: Patient seen and examined at bedside.  Denies chest pain, dizziness, palpitations or SOB.     Overnight Events:     TELEMETRY: Afib with HR mainly 's.   EKG:      VITAL SIGNS:  T(C): 36.9, Max: 36.9 (03-21 @ 09:19)  HR: 108 (84 - 142)  BP: 117/71 (106/68 - 146/87)  RR: 14 (14 - 18)  SpO2: 96% (95% - 96%)  Wt(kg): --    I&O's Summary  I & Os for 24h ending 21 Mar 2017 07:00  =============================================  IN: 136 ml / OUT: 0 ml / NET: 136 ml    I & Os for current day (as of 21 Mar 2017 14:46)  =============================================  IN: 360 ml / OUT: 200 ml / NET: 160 ml        PHYSICAL EXAM:    General: A/ox 3, No acute Distress, severe scoliosis  Neck: Supple, NO JVD  Cardiac: Irregularly irregular.   Pulmonary: CTAB, Breathing unlabored, No Rhonchi/Rales/Wheezing  Abdomen: Soft, Non -tender, +BS x 4 quads  Extremities: No Rashes, No edema  Neuro: A/o x 3, No focal deficits    Lines:       LABS:                          14.8   8.3   )-----------( 254      ( 21 Mar 2017 08:09 )             43.3                              21 Mar 2017 08:09    138    |  108    |  18     ----------------------------<  87     4.0     |  19     |  0.82     Ca    8.6        21 Mar 2017 08:09  Phos  3.4       20 Mar 2017 12:52  Mg     2.1       21 Mar 2017 08:09    TPro  6.6    /  Alb  3.2    /  TBili  1.1    /  DBili  x      /  AST  15     /  ALT  24     /  AlkPhos  53     21 Mar 2017 08:09    LIVER FUNCTIONS - ( 21 Mar 2017 08:09 )  Alb: 3.2 g/dL / Pro: 6.6 g/dL / ALK PHOS: 53 U/L / ALT: 24 U/L / AST: 15 U/L / GGT: x                                 PT/INR - ( 20 Mar 2017 12:52 )   PT: 13.2 sec;   INR: 1.19          PTT - ( 21 Mar 2017 08:09 )  PTT:93.3 sec  CAPILLARY BLOOD GLUCOSE    CARDIAC MARKERS ( 21 Mar 2017 02:19 )  <0.015 ng/mL / x     / 28 U/L / x     / <1.0 ng/mL  CARDIAC MARKERS ( 20 Mar 2017 20:24 )  <0.015 ng/mL / x     / 26 U/L / x     / <1.0 ng/mL  CARDIAC MARKERS ( 20 Mar 2017 12:52 )  <0.015 ng/mL / x     / 40 U/L / x     / <1.0 ng/mL            Aleve (Unknown)  Sudafed (Unknown)    MEDICATIONS  (STANDING):  atorvastatin 20milliGRAM(s) Oral at bedtime  pantoprazole    Tablet 40milliGRAM(s) Oral before breakfast  aspirin enteric coated 81milliGRAM(s) Oral daily  diltiazem    Tablet 60milliGRAM(s) Oral every 6 hours  heparin  Infusion 900Unit(s)/Hr IV Continuous <Continuous>    MEDICATIONS  (PRN):        DIAGNOSTIC TESTS:

## 2017-03-21 NOTE — DISCHARGE NOTE ADULT - CARE PROVIDER_API CALL
Nazario Epperson), Cardiovascular Disease; Internal Medicine  [address] [place] [address]  Phone: [phone]  Fax: [phone]    Carmina Noyola), EndocrinologyMetabDiabetes; Internal Medicine  [address] [place] [address]  Phone: [phone]  Fax: [phone]

## 2017-03-21 NOTE — DISCHARGE NOTE ADULT - CARE PLAN
Principal Discharge DX:	Atrial fibrillation  Goal:	Heart rate control and prevent risk of stroke.  Instructions for follow-up, activity and diet:	You were admitted for an abnormal heart rhythm called atrial fibrillation or Afib. In people with Afib, the electrical signals that control the heart rate are abnormal. As a result, the top two chambers of the heart stop pumping effectively. and  a small amount of blood gets left behind. As the blood pools, it can start to form clots and these clots can travel through the blood vessels and cause strokes. You were evaluated by the Electrophysiology team and they tried to attempt a procedure called cardioversion to restore normal rhythm.  However, this did not work and you reverted back to Afib within a short period of time. You have been started on a medication called Metoprolol to help control the heart rate and a blood thinner called Xarelto prevent the risk of stroke; please take them as prescribed.  Follow up with Dr. Epperson or your own Cardiologist within one week of discharge. Your Afib is driven by overactive thyroid as you were overmedicated with Synthroid.  Please follow the directions below.  Secondary Diagnosis:	Hypothyroid  Instructions for follow-up, activity and diet:	You were overtreated for hypothyroidism and your thyroid functions tests very abnormal.  You were evaluated by an Endocrinologist and they made the recommendation to hold your levothyroxine for one week and follow up with Dr. Noyola on Wednesday, March 29th.  She will likely restart a lower dose of Synthroid 137 mcg at that time.  The prescription has been sent to your pharmacy but you should discuss with her before starting this new dose. Principal Discharge DX:	Atrial fibrillation  Goal:	Heart rate control and prevent risk of stroke.  Instructions for follow-up, activity and diet:	You were admitted for an abnormal heart rhythm called atrial fibrillation or Afib. In people with Afib, the electrical signals that control the heart rate are abnormal. As a result, the top two chambers of the heart stop pumping effectively. and  a small amount of blood gets left behind. As the blood pools, it can start to form clots and these clots can travel through the blood vessels and cause strokes. You were evaluated by the Electrophysiology team and they tried to attempt a procedure called cardioversion to restore normal rhythm.  However, this did not work and you reverted back to Afib within a short period of time. You have been started on a medication called Metoprolol to help control the heart rate and a blood thinner called Xarelto prevent the risk of stroke; please take them as prescribed.  Follow up with Dr. Epperson or your own Cardiologist within one week of discharge. Your Afib is driven by overactive thyroid as you were overmedicated with Levothyroxine.  Please follow the directions below.  Secondary Diagnosis:	Hypothyroid  Instructions for follow-up, activity and diet:	You were overtreated for hypothyroidism and your thyroid functions tests very abnormal.  You were evaluated by an Endocrinologist and they made the recommendation to hold your levothyroxine for one week and follow up with Dr. Noyola on Wednesday, March 29th.  She will likely restart a lower dose of Synthroid 137 mcg at that time.  The prescription has been sent to your pharmacy but you should discuss with her before starting this new dose.

## 2017-03-21 NOTE — CONSULT NOTE ADULT - SUBJECTIVE AND OBJECTIVE BOX
HPI:  66 y/o male with pmhx of Scoliosis  Hypothyroidism and Hyperlipidemia was sent in by his Cardiologist Dr. Staley for  new onset Afib. Patient states he has been feeling extreme fatigue and ESPINOZA with one flight of stairs for the last couple of months but progressively worsening in the last 3 weeks. He also c/o intermittent palpitations but denies chest pain, dizziness, orthopnea, PND, recent illness, travel.  Denies any recent changes in medications. Denies Tobacco dependance, ETOH or illicit drugs.  He was seen by Dr. Staley in the office today and found to be in rapid Afib for which he was sent to the ED.  Family history significant for Father dying of MI at age 55.  Last ischemic evaluation was last year which was a nuclear stress test reportedly normal.     In the Ed, /71 to 123/83, 's initially improved to 90s' after Cardizem, HR 16-18, O2 sat 96% on RA  Patient was given 1 liter fluid bolus, Cardizem 20 mg IV x 1 and 30 mg PO x 1. (20 Mar 2017 14:59)    TFTS drawn in hospital show overtreatment with Synthroid.       PAST MEDICAL & SURGICAL HISTORY:  Scoliosis  Hypercholesteremia  Hypothyroid      FAMILY HISTORY:      SOCIAL HISTORY:  Smoking:  ETOH:  Drug use:    HOME MEDICATIONS:        MEDICATIONS  (STANDING):  atorvastatin 20milliGRAM(s) Oral at bedtime  pantoprazole    Tablet 40milliGRAM(s) Oral before breakfast  aspirin enteric coated 81milliGRAM(s) Oral daily  diltiazem    Tablet 60milliGRAM(s) Oral every 6 hours  heparin  Infusion 900Unit(s)/Hr IV Continuous <Continuous>    MEDICATIONS  (PRN):      Allergies    Aleve (Unknown)  Sudafed (Unknown)    Intolerances        REVIEW OF SYSTEMS  CONSTITUTIONAL:  Negative fever or chills  EYES:  Negative visual field deficit, blurry vision or double vision  ENT:  Negative for sore throat, runny nose, or earache  CARDIOVASCULAR:  Negative for chest pain or palpitations  RESPIRATORY:  Negative for cough, wheezing, sputum production, or SOB   GASTROINTESTINAL:  Negative for nausea, vomiting, diarrhea, or abdominal pain  GENITOURINARY:  Negative frequency, urgency or dysuria  MUSCULOSKELETAL:  No joint pain or stiffness  INTEGUMENTARY: no rashes  NEUROLOGIC:  No headache, confusion, syncope or seizures  PSYCHIATRIC: No depression or anxiety  HEMATOLOGY AND ONCOLOGY:  No unusual infections or bleeding    CAPILLARY GLUCOSE:  CAPILLARY BLOOD GLUCOSE        LABS:                        14.8   8.3   )-----------( 254      ( 21 Mar 2017 08:09 )             43.3     21 Mar 2017 08:09    138    |  108    |  18     ----------------------------<  87     4.0     |  19     |  0.82     Ca    8.6        21 Mar 2017 08:09  Phos  3.4       20 Mar 2017 12:52  Mg     2.1       21 Mar 2017 08:09    TPro  6.6    /  Alb  3.2    /  TBili  1.1    /  DBili  x      /  AST  15     /  ALT  24     /  AlkPhos  53     21 Mar 2017 08:09    PT/INR - ( 20 Mar 2017 12:52 )   PT: 13.2 sec;   INR: 1.19          PTT - ( 21 Mar 2017 08:09 )  PTT:93.3 sec      RADIOLOGY & ADDITIONAL STUDIES:      Physical Examination:  Vital Signs Last 24 Hrs  T(C): 36.9, Max: 36.9 (03-21 @ 09:19)  T(F): 98.4, Max: 98.4 (03-21 @ 09:19)  HR: 108 (79 - 175)  BP: 117/71 (102/71 - 152/87)  BP(mean): 92 (87 - 120)  RR: 14 (14 - 20)  SpO2: 96% (95% - 98%)    Constitutional: wn/wd in NAD.   HEENT: NCAT, MMM, OP clear, EOMI, , no proptosis or lid retraction  Neck: no thyromegaly or palpable thyroid nodules   Respiratory: lungs CTAB.  Cardiovascular: regular rhythm, normal S1 and S2, no audible murmurs, no peripheral edema  GI: soft, NT/ND, no masses/HSM appreciated.  Neurology: no tremors.   Skin: no visible rashes/lesions  Psychiatric: AAO x 3, normal affect/mood.      ASSESSMENT/RECOMMENDATIONS:  67yMale admitted for Patient is a 67y old  Male who presents with a chief complaint of New Onset Afib (20 Mar 2017 14:59)  found to be thyrotoxic on Synthroid    Recommend:  Hold Synthroid.   Can be discharged on decreased dose of Synthroid 150mcg. To follow up in 4 weeks for repeat TFTs.      Plan discussed with  (  ). Management  of other medical issues per primary team.    Attending attestation:  I have seen and evaluated the patient at the bedside.   Endocrine Nurse Practitioner/Fellow/Resident’s note reviewed, including vital signs, PE and laboratory results.  Direct personal management and extensive interpretation of the data conducted.   I agree with the Nurse Practitioner/Fellow/Resident’s assessment and recommendations as above. HPI:  68 y/o male with pmhx of Scoliosis  Hypothyroidism and Hyperlipidemia was sent in by his Cardiologist Dr. Staley for  new onset Afib. Patient states he has been feeling extreme fatigue and ESPINOZA with one flight of stairs for the last couple of months but progressively worsening in the last 3 weeks. He also c/o intermittent palpitations but denies chest pain, dizziness, orthopnea, PND, recent illness, travel.  Denies any recent changes in medications. Denies Tobacco dependance, ETOH or illicit drugs.  He was seen by Dr. Staley in the office today and found to be in rapid Afib for which he was sent to the ED.  Family history significant for Father dying of MI at age 55.  Last ischemic evaluation was last year which was a nuclear stress test reportedly normal.     In the Ed, /71 to 123/83, 's initially improved to 90s' after Cardizem, HR 16-18, O2 sat 96% on RA  Patient was given 1 liter fluid bolus, Cardizem 20 mg IV x 1 and 30 mg PO x 1. (20 Mar 2017 14:59)    TFTS drawn in hospital show overtreatment with Synthroid 200mcg with suppressed TSH and elevated total T4.       PAST MEDICAL & SURGICAL HISTORY:  Scoliosis  Hypercholesteremia  Hypothyroid      FAMILY HISTORY:      SOCIAL HISTORY:  Smoking:  ETOH:  Drug use:    HOME MEDICATIONS:        MEDICATIONS  (STANDING):  atorvastatin 20milliGRAM(s) Oral at bedtime  pantoprazole    Tablet 40milliGRAM(s) Oral before breakfast  aspirin enteric coated 81milliGRAM(s) Oral daily  diltiazem    Tablet 60milliGRAM(s) Oral every 6 hours  heparin  Infusion 900Unit(s)/Hr IV Continuous <Continuous>    MEDICATIONS  (PRN):      Allergies    Aleve (Unknown)  Sudafed (Unknown)    Intolerances        REVIEW OF SYSTEMS  CONSTITUTIONAL:  Negative fever or chills  EYES:  Negative visual field deficit, blurry vision or double vision  ENT:  Negative for sore throat, runny nose, or earache  CARDIOVASCULAR:  Negative for chest pain or palpitations  RESPIRATORY:  Negative for cough, wheezing, sputum production, or SOB   GASTROINTESTINAL:  Negative for nausea, vomiting, diarrhea, or abdominal pain  GENITOURINARY:  Negative frequency, urgency or dysuria  MUSCULOSKELETAL:  No joint pain or stiffness  INTEGUMENTARY: no rashes  NEUROLOGIC:  No headache, confusion, syncope or seizures  PSYCHIATRIC: No depression or anxiety  HEMATOLOGY AND ONCOLOGY:  No unusual infections or bleeding    CAPILLARY GLUCOSE:  CAPILLARY BLOOD GLUCOSE        LABS:                        14.8   8.3   )-----------( 254      ( 21 Mar 2017 08:09 )             43.3     21 Mar 2017 08:09    138    |  108    |  18     ----------------------------<  87     4.0     |  19     |  0.82     Ca    8.6        21 Mar 2017 08:09  Phos  3.4       20 Mar 2017 12:52  Mg     2.1       21 Mar 2017 08:09    TPro  6.6    /  Alb  3.2    /  TBili  1.1    /  DBili  x      /  AST  15     /  ALT  24     /  AlkPhos  53     21 Mar 2017 08:09    PT/INR - ( 20 Mar 2017 12:52 )   PT: 13.2 sec;   INR: 1.19          PTT - ( 21 Mar 2017 08:09 )  PTT:93.3 sec      RADIOLOGY & ADDITIONAL STUDIES:      Physical Examination:  Vital Signs Last 24 Hrs  T(C): 36.9, Max: 36.9 (03-21 @ 09:19)  T(F): 98.4, Max: 98.4 (03-21 @ 09:19)  HR: 108 (79 - 175)  BP: 117/71 (102/71 - 152/87)  BP(mean): 92 (87 - 120)  RR: 14 (14 - 20)  SpO2: 96% (95% - 98%)    Constitutional: wn/wd in NAD.   HEENT: NCAT, MMM, OP clear, EOMI, , no proptosis or lid retraction  Neck: no thyromegaly or palpable thyroid nodules   Respiratory: lungs CTAB.  Cardiovascular: irreg s1s2  GI: soft, NT/ND, no masses/HSM appreciated.  Neurology: no tremors.   Skin: no visible rashes/lesions  Psychiatric: AAO x 3, normal affect/mood.      ASSESSMENT/RECOMMENDATIONS:  67yMale admitted for Patient is a 67y old  Male who presents with a chief complaint of New Onset Afib (20 Mar 2017 14:59)  found to be thyrotoxic on Synthroid    Recommend:  1. Hypothyroidism: Overtreated. Hold Synthroid. Can be discharged on decreased dose of Synthroid 150mcg. Beta blockade per cardiology. To follow up in 4 weeks for repeat TFTs.  2. Prediabetes: A1C of 6.1% To be managed outpatient. Monitor FS inpatient. HPI:  66 y/o male with pmhx of Scoliosis  Hypothyroidism and Hyperlipidemia was sent in by his Cardiologist Dr. Staley for  new onset Afib. Patient states he has been feeling extreme fatigue and ESPINOZA with one flight of stairs for the last couple of months but progressively worsening in the last 3 weeks. He also c/o intermittent palpitations but denies chest pain, dizziness, orthopnea, PND, recent illness, travel.  Denies any recent changes in medications. Denies Tobacco dependance, ETOH or illicit drugs.  He was seen by Dr. Staley in the office today and found to be in rapid Afib for which he was sent to the ED.  Family history significant for Father dying of MI at age 55.  Last ischemic evaluation was last year which was a nuclear stress test reportedly normal.     In the Ed, /71 to 123/83, 's initially improved to 90s' after Cardizem, HR 16-18, O2 sat 96% on RA  Patient was given 1 liter fluid bolus, Cardizem 20 mg IV x 1 and 30 mg PO x 1. (20 Mar 2017 14:59)    TFTS drawn in hospital show overtreatment with Synthroid 200mcg with suppressed TSH and elevated total and free T4.       PAST MEDICAL & SURGICAL HISTORY:  Scoliosis  Hypercholesteremia  Hypothyroid      FAMILY HISTORY:      SOCIAL HISTORY:  Smoking:  ETOH:  Drug use:    HOME MEDICATIONS:        MEDICATIONS  (STANDING):  atorvastatin 20milliGRAM(s) Oral at bedtime  pantoprazole    Tablet 40milliGRAM(s) Oral before breakfast  aspirin enteric coated 81milliGRAM(s) Oral daily  diltiazem    Tablet 60milliGRAM(s) Oral every 6 hours  heparin  Infusion 900Unit(s)/Hr IV Continuous <Continuous>    MEDICATIONS  (PRN):      Allergies    Aleve (Unknown)  Sudafed (Unknown)        REVIEW OF SYSTEMS  CONSTITUTIONAL:  Negative fever or chills  EYES:  Negative visual field deficit, blurry vision or double vision  ENT:  Negative for sore throat, runny nose, or earache  CARDIOVASCULAR:  Negative for chest pain or palpitations  RESPIRATORY:  Negative for cough, wheezing, sputum production, or SOB   GASTROINTESTINAL:  Negative for nausea, vomiting, diarrhea, or abdominal pain  GENITOURINARY:  Negative frequency, urgency or dysuria  MUSCULOSKELETAL:  No joint pain or stiffness  INTEGUMENTARY: no rashes  NEUROLOGIC:  No headache, confusion, syncope or seizures  PSYCHIATRIC: No depression or anxiety  HEMATOLOGY AND ONCOLOGY:  No unusual infections or bleeding    CAPILLARY GLUCOSE:  CAPILLARY BLOOD GLUCOSE        LABS:                        14.8   8.3   )-----------( 254      ( 21 Mar 2017 08:09 )             43.3     21 Mar 2017 08:09    138    |  108    |  18     ----------------------------<  87     4.0     |  19     |  0.82     Ca    8.6        21 Mar 2017 08:09  Phos  3.4       20 Mar 2017 12:52  Mg     2.1       21 Mar 2017 08:09    TPro  6.6    /  Alb  3.2    /  TBili  1.1    /  DBili  x      /  AST  15     /  ALT  24     /  AlkPhos  53     21 Mar 2017 08:09    PT/INR - ( 20 Mar 2017 12:52 )   PT: 13.2 sec;   INR: 1.19          PTT - ( 21 Mar 2017 08:09 )  PTT:93.3 sec      RADIOLOGY & ADDITIONAL STUDIES:      Physical Examination:  Vital Signs Last 24 Hrs  T(C): 36.9, Max: 36.9 (03-21 @ 09:19)  T(F): 98.4, Max: 98.4 (03-21 @ 09:19)  HR: 108 (79 - 175)  BP: 117/71 (102/71 - 152/87)  BP(mean): 92 (87 - 120)  RR: 14 (14 - 20)  SpO2: 96% (95% - 98%)    Constitutional: wn/wd in NAD.   HEENT: NCAT, MMM, OP clear, EOMI, , no proptosis or lid retraction  Neck: no thyromegaly or palpable thyroid nodules   Respiratory: lungs CTAB.  Cardiovascular: irreg s1s2  GI: soft, NT/ND, no masses/HSM appreciated.  Neurology: no tremors.   Skin: no visible rashes/lesions  Psychiatric: AAO x 3, normal affect/mood.      ASSESSMENT/RECOMMENDATIONS:  67yMale admitted for Patient is a 67y old  Male who presents with a chief complaint of New Onset Afib (20 Mar 2017 14:59)  found to be thyrotoxic on Synthroid    Recommend:  1. Hypothyroidism: Overtreated. Hold Synthroid. Can be discharged on decreased dose of Synthroid 137mcg. Beta blockade per cardiology. To follow up in 4 weeks for repeat TFTs.  2. Prediabetes: A1C of 5.9% To be managed outpatient. Monitor FS inpatient.

## 2017-03-23 DIAGNOSIS — R73.03 PREDIABETES: ICD-10-CM

## 2017-03-23 DIAGNOSIS — E05.80 OTHER THYROTOXICOSIS WITHOUT THYROTOXIC CRISIS OR STORM: ICD-10-CM

## 2017-03-23 DIAGNOSIS — Z82.49 FAMILY HISTORY OF ISCHEMIC HEART DISEASE AND OTHER DISEASES OF THE CIRCULATORY SYSTEM: ICD-10-CM

## 2017-03-23 DIAGNOSIS — I48.91 UNSPECIFIED ATRIAL FIBRILLATION: ICD-10-CM

## 2017-03-23 DIAGNOSIS — E03.9 HYPOTHYROIDISM, UNSPECIFIED: ICD-10-CM

## 2017-03-23 DIAGNOSIS — T38.1X5A ADVERSE EFFECT OF THYROID HORMONES AND SUBSTITUTES, INITIAL ENCOUNTER: ICD-10-CM

## 2017-03-23 DIAGNOSIS — E78.5 HYPERLIPIDEMIA, UNSPECIFIED: ICD-10-CM

## 2017-03-23 DIAGNOSIS — I10 ESSENTIAL (PRIMARY) HYPERTENSION: ICD-10-CM

## 2017-03-23 DIAGNOSIS — M41.9 SCOLIOSIS, UNSPECIFIED: ICD-10-CM

## 2017-11-17 ENCOUNTER — EMERGENCY (EMERGENCY)
Facility: HOSPITAL | Age: 67
LOS: 1 days | Discharge: HOME | End: 2017-11-17
Attending: EMERGENCY MEDICINE | Admitting: EMERGENCY MEDICINE
Payer: COMMERCIAL

## 2017-11-17 VITALS
TEMPERATURE: 98 F | WEIGHT: 134.92 LBS | HEART RATE: 72 BPM | SYSTOLIC BLOOD PRESSURE: 156 MMHG | RESPIRATION RATE: 18 BRPM | DIASTOLIC BLOOD PRESSURE: 95 MMHG | OXYGEN SATURATION: 97 %

## 2017-11-17 DIAGNOSIS — Z79.899 OTHER LONG TERM (CURRENT) DRUG THERAPY: ICD-10-CM

## 2017-11-17 DIAGNOSIS — S09.90XA UNSPECIFIED INJURY OF HEAD, INITIAL ENCOUNTER: ICD-10-CM
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
## 2017-11-17 PROBLEM — E03.9 HYPOTHYROIDISM, UNSPECIFIED: Chronic | Status: ACTIVE | Noted: 2017-03-20

## 2017-11-17 PROBLEM — M41.9 SCOLIOSIS, UNSPECIFIED: Chronic | Status: ACTIVE | Noted: 2017-03-20

## 2017-11-17 PROBLEM — E78.00 PURE HYPERCHOLESTEROLEMIA, UNSPECIFIED: Chronic | Status: ACTIVE | Noted: 2017-03-20

## 2017-11-17 LAB
ALBUMIN SERPL ELPH-MCNC: 4.1 G/DL — SIGNIFICANT CHANGE UP (ref 3.3–5)
ALP SERPL-CCNC: 56 U/L — SIGNIFICANT CHANGE UP (ref 40–120)
ALT FLD-CCNC: 16 U/L — SIGNIFICANT CHANGE UP (ref 10–45)
ANION GAP SERPL CALC-SCNC: 14 MMOL/L — SIGNIFICANT CHANGE UP (ref 5–17)
APTT BLD: 41.6 SEC — HIGH (ref 27.5–37.4)
AST SERPL-CCNC: 18 U/L — SIGNIFICANT CHANGE UP (ref 10–40)
BASOPHILS NFR BLD AUTO: 0.6 % — SIGNIFICANT CHANGE UP (ref 0–2)
BILIRUB SERPL-MCNC: 0.8 MG/DL — SIGNIFICANT CHANGE UP (ref 0.2–1.2)
BUN SERPL-MCNC: 20 MG/DL — SIGNIFICANT CHANGE UP (ref 7–23)
CALCIUM SERPL-MCNC: 9.5 MG/DL — SIGNIFICANT CHANGE UP (ref 8.4–10.5)
CHLORIDE SERPL-SCNC: 99 MMOL/L — SIGNIFICANT CHANGE UP (ref 96–108)
CO2 SERPL-SCNC: 23 MMOL/L — SIGNIFICANT CHANGE UP (ref 22–31)
CREAT SERPL-MCNC: 0.89 MG/DL — SIGNIFICANT CHANGE UP (ref 0.5–1.3)
EOSINOPHIL NFR BLD AUTO: 1.7 % — SIGNIFICANT CHANGE UP (ref 0–6)
GLUCOSE SERPL-MCNC: 103 MG/DL — HIGH (ref 70–99)
HCT VFR BLD CALC: 46.7 % — SIGNIFICANT CHANGE UP (ref 39–50)
HGB BLD-MCNC: 15.9 G/DL — SIGNIFICANT CHANGE UP (ref 13–17)
INR BLD: 1.35 — HIGH (ref 0.88–1.16)
LYMPHOCYTES # BLD AUTO: 31.2 % — SIGNIFICANT CHANGE UP (ref 13–44)
MCHC RBC-ENTMCNC: 28 PG — SIGNIFICANT CHANGE UP (ref 27–34)
MCHC RBC-ENTMCNC: 34 G/DL — SIGNIFICANT CHANGE UP (ref 32–36)
MCV RBC AUTO: 82.2 FL — SIGNIFICANT CHANGE UP (ref 80–100)
MONOCYTES NFR BLD AUTO: 9.5 % — SIGNIFICANT CHANGE UP (ref 2–14)
NEUTROPHILS NFR BLD AUTO: 57 % — SIGNIFICANT CHANGE UP (ref 43–77)
PLATELET # BLD AUTO: 219 K/UL — SIGNIFICANT CHANGE UP (ref 150–400)
POTASSIUM SERPL-MCNC: 4.3 MMOL/L — SIGNIFICANT CHANGE UP (ref 3.5–5.3)
POTASSIUM SERPL-SCNC: 4.3 MMOL/L — SIGNIFICANT CHANGE UP (ref 3.5–5.3)
PROT SERPL-MCNC: 7.9 G/DL — SIGNIFICANT CHANGE UP (ref 6–8.3)
PROTHROM AB SERPL-ACNC: 15.1 SEC — HIGH (ref 9.8–12.7)
RBC # BLD: 5.68 M/UL — SIGNIFICANT CHANGE UP (ref 4.2–5.8)
RBC # FLD: 13.3 % — SIGNIFICANT CHANGE UP (ref 10.3–16.9)
SODIUM SERPL-SCNC: 136 MMOL/L — SIGNIFICANT CHANGE UP (ref 135–145)
WBC # BLD: 6.3 K/UL — SIGNIFICANT CHANGE UP (ref 3.8–10.5)
WBC # FLD AUTO: 6.3 K/UL — SIGNIFICANT CHANGE UP (ref 3.8–10.5)

## 2017-11-17 PROCEDURE — 70450 CT HEAD/BRAIN W/O DYE: CPT

## 2017-11-17 PROCEDURE — 85730 THROMBOPLASTIN TIME PARTIAL: CPT

## 2017-11-17 PROCEDURE — 70450 CT HEAD/BRAIN W/O DYE: CPT | Mod: 26

## 2017-11-17 PROCEDURE — 99284 EMERGENCY DEPT VISIT MOD MDM: CPT | Mod: 25

## 2017-11-17 PROCEDURE — 80053 COMPREHEN METABOLIC PANEL: CPT

## 2017-11-17 PROCEDURE — 85025 COMPLETE CBC W/AUTO DIFF WBC: CPT

## 2017-11-17 PROCEDURE — 85610 PROTHROMBIN TIME: CPT

## 2017-11-17 PROCEDURE — 99285 EMERGENCY DEPT VISIT HI MDM: CPT

## 2017-11-17 PROCEDURE — 36415 COLL VENOUS BLD VENIPUNCTURE: CPT

## 2017-11-17 RX ORDER — ATORVASTATIN CALCIUM 80 MG/1
1 TABLET, FILM COATED ORAL
Qty: 0 | Refills: 0 | COMMUNITY
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
## 2017-11-17 NOTE — ED PROVIDER NOTE - CARE PLAN
Principal Discharge DX:	Head injury  Instructions for follow-up, activity and diet:	Please follow up with your PMD and redo lab results. If you experience any concerning symptoms please return to the ED.
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
## 2017-11-17 NOTE — ED PROVIDER NOTE - PLAN OF CARE
Please follow up with your PMD and redo lab results. If you experience any concerning symptoms please return to the ED.

## 2017-11-17 NOTE — ED PROVIDER NOTE - ATTENDING CONTRIBUTION TO CARE
I had a face to face visit with the pat. Neuro intact on exam. Gen/CV/Pulm exam WNL. Agre with management and disposition
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
## 2019-02-15 NOTE — ED PROVIDER NOTE - DIAGNOSTIC INTERPRETATION
ER Physician:  Vilma Director  CHEST XRAY INTERPRETATION: lungs clear, heart shadow normal, bony structures intact , scoliosis
Yes

## 2019-09-16 ENCOUNTER — APPOINTMENT (OUTPATIENT)
Dept: PULMONOLOGY | Facility: CLINIC | Age: 69
End: 2019-09-16
Payer: COMMERCIAL

## 2019-09-16 VITALS
SYSTOLIC BLOOD PRESSURE: 120 MMHG | OXYGEN SATURATION: 94 % | HEIGHT: 67 IN | BODY MASS INDEX: 21.03 KG/M2 | DIASTOLIC BLOOD PRESSURE: 90 MMHG | WEIGHT: 134 LBS | TEMPERATURE: 97.9 F | HEART RATE: 84 BPM

## 2019-09-16 PROCEDURE — 99214 OFFICE O/P EST MOD 30 MIN: CPT | Mod: 25

## 2019-09-16 PROCEDURE — 94010 BREATHING CAPACITY TEST: CPT

## 2019-09-17 NOTE — PROCEDURE
[FreeTextEntry1] : spirometry is restricted, perhaps obstructed as well, but absolutely clear lung speaks against obstructive lung disease of any degree

## 2019-09-17 NOTE — HISTORY OF PRESENT ILLNESS
[FreeTextEntry1] : here because a nodule was seen.  in the past there was an intrpulmonary lymph node. and now with cardiac scan similar finding is described. he has severe kypho scoliosis and his dyspnea is due to compa disease.  a ct done by cardiologist, but he failed to look at previous reports that describe the same nodule.

## 2019-09-17 NOTE — PHYSICAL EXAM
[Normal Conjunctiva] : the conjunctiva exhibited no abnormalities [Eyelids - No Xanthelasma] : the eyelids demonstrated no xanthelasmas [Normal Oropharynx] : normal oropharynx [Neck Appearance] : the appearance of the neck was normal [Neck Cervical Mass (___cm)] : no neck mass was observed [Jugular Venous Distention Increased] : there was no jugular-venous distention [Thyroid Diffuse Enlargement] : the thyroid was not enlarged [Thyroid Nodule] : there were no palpable thyroid nodules [Heart Rate And Rhythm] : heart rate and rhythm were normal [Heart Sounds] : normal S1 and S2 [Murmurs] : no murmurs present [Respiration, Rhythm And Depth] : normal respiratory rhythm and effort [Exaggerated Use Of Accessory Muscles For Inspiration] : no accessory muscle use [Auscultation Breath Sounds / Voice Sounds] : lungs were clear to auscultation bilaterally [FreeTextEntry1] : bs are less on the left due to kypho scoliosis [Kyphosis] : kyphosis [Scoliosis] : scoliosis [Abnormal Walk] : normal gait [Gait - Sufficient For Exercise Testing] : the gait was sufficient for exercise testing [Nail Clubbing] : no clubbing of the fingernails [Cyanosis, Localized] : no localized cyanosis [Petechial Hemorrhages (___cm)] : no petechial hemorrhages [] : no ischemic changes

## 2019-09-17 NOTE — DISCUSSION/SUMMARY
[FreeTextEntry1] : this appear to be a chronic finding and the dyspne has been explained in the past in previous consultation being due to kyphoscoliosis. sudden onset

## 2019-09-17 NOTE — REVIEW OF SYSTEMS
[Dyspnea] : dyspnea [Anxiety] : anxiety [Negative] : Genitourinary [de-identified] : somewhat over anxious about this, and his wife even more so

## 2020-04-06 NOTE — ED PROVIDER NOTE - SKIN [+], MLM
Jennifer Tineoenter  [id number]               Adult CD Progress Note and Treatment Plan Review     Attendance       Thursday    Group Date: 8/1/2019   Group Attendance Attended group session   Group Therapy Type Addiction   Group Topic Covered Coping Skills/Lifestyle Management, Goal Setting, Relapse Prevention and sober support groups   Client's Response To Topic Cooperative with task. Discussed personal experience with topic. Expressed readiness to alter behaviors. Listened actively. Offered helpful suggestions to peers.   Client Participation Detail Highly involved   Attendance 2.0 Hours   Individual Attendance None   Family Attendance None   Other Comments/Information None     Total # of Phase 1 Group Sessions: 24 sessions plus service initiation and treatment planning sessions.     Total # of Phase 2 Group Sessions: 15   Total # of Phase 3 Group Sessions:   6       Support group attended this week: yes    Reporting sobriety: Yes, client reports last use date of date of heroin, methamphetamine and benzodiazapine as 10/24/2018 and last use date of alcohol as  3/25/2019.    Treatment Plan Review     Treatment Plan Review completed on:  8/1/2019     Projected discharge date: 8/1/2019    Client preferred learning style: Reading    Staff member(s) contributing: BUSTER Best, KYRA    Received supervision: No.    Client involvement with treatment planning: contributed to goals and plan.    Client received copy of plan/revised plan: Yes    Client agrees with plan/revised plan: Yes    Changes to Treatment Plan: No    Client's Dimension 1 Goal(s) Develop effective strategies to maintain sobriety.  Continue Suboxone maintenance.  Report to counselor and group any alcohol or substance use.    See below.   Client's Dimension 2 Goal(s) Obtain a neurological examination.  Disclose CD status to medical providers and follow up with medical interventions while in IOP.  Maintain good physical health. See below.   Client's 
Dimension 3 Goal(s) Learn how to apply self-care and psychotherapy to build a repertoire of daily habits that counteract PAWS, fatigue, depression and anxiety leading to increased resiliency and well-being.  Schedule a mental health diagnostic assessment.  Understand the relationship between mental health concerns and addiction. See below.   Client's Dimension 4 Goal(s) Understand the impact your substance use has had on you, your family, and significant relationships.  Increase internal motivation to change.    See below.   Client's Dimension 5 Goal(s) Identify and understand personal relapse and self-sabotage patterns.  Identify personal triggers and relapse warning signs.  Develop sober coping and living skills in order to address the development of a strategy for long term recovery. Client presented assignment.   Client's Dimension 6 Goal(s) Develop a sober support network.  Increase sober support network. Identify and attend sober support group meetings.  Heal relationship with boyfriend. See below.     New Goals added since last review: None.    Goal(s) worked on since last review: dim 5, 6    Strategies effective: Yes    Care Coordination Activities: discussed how to find a sponsor    Medical, Mental Health and other appointments the client attended: psychotherapy 7/28/19     Medication issues: client is on Suboxone maintenance    Physical and mental health problems: Yes - Client recommended to complete neurological exam.  Review and evaluation of the individual abuse prevention plan: The program's individual abuse prevention plan (IAPP) is sufficient for this client.     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 
"(305.1)    [facility] Risk Rating: (Note the rationale for risk rating changes)    Dimension 1 - 0  On Thursday client exhibits no signs of intoxication or withdrawal.  Client is on Suboxone maintenance    Dimension 2 - 0  On Thursday client denied any new or worsening physical problems.  She  denies any current biomedical appointments.  She reports meeting her exercise goal this week.    Dimension 3 - 2  On Thursday client rates her depression, anxiety, or other mental health concerns as 1 of 10 (10 highest) due to \"ORP (offenders program)\".  Client denies any thoughts of hurting herself or others.  Client reports attending session with psychotherapist.  Client attended 7/28/19 at [facility] and finds these sessions very helpful.    Dimension 4 - 0  On Thursday client rates her motivation for recovery as 10 of 10 (10 highest).  Client identified 3 reasons she wants to remain sober.      Dimension 5 - 1  On Thursday client rates her strongest craving to use in the past week as 2 of 10 (10 highest).  Client states \"attend therapy, be open and honest\" as something she did to change her thinking and behaviors for the sake of her sobriety.  She identifies personal strength of resourceful.  She reports using coping skill of psychotherapy extroversion.  Client presented page 3 of her \"Recovery Care Plan\" assignment.     Dimension 6 - 1  On Thursday client reports attendance at 2 sober support meetings in the past week.  Client reports having a sponsor but is looking for a new one.  Client reports she has moved into her ex-boyfriend's home as a temporary solution to her housing situation, and describes the arrangement to be mutually understood as temporary and platonic.  Client participated in sober activities.    Data: (Need to include short narrative for the week on what was worked on)  This week we discussed motivation to change, relapse prevention and recovery planning.  Client participated with the check- "
in process.  Recovery Planning was addressed through client's presentation of the third page of her  Recovery Care Plan  assignment with discussion.  Client spoke about building healthy relationships and strategies to find new ones, and about her desire to become more involved in recovery ministries.  Group peer presented the final portions of her Recovery Care Plan.  Client participated, provided supportive feedback, and spoke about how she related to recovery goals, routines, use of healthy relationships as well as avoidance of unhealthy relationships, and the important role of spirituality in recovery as described in peer's assignment.  Client discussed how suboxone maintenance was beneficial to relapse prevention.  Client expressed something she was grateful for.    Intervention:   Behavioral Therapy, Cognitive Behavioral Therapy, Counselor Feedback, Education, Emotional management, Group Feedback, Motivational Interviewing, Relapse Prevention, Twelve Step Facilitation, REBT, art therapy    Assessment:    Stages of Change Model  Contemplation    Appears/Sounds:  Cooperative  Motivated    Plan:   Client will continue attending Phase 3 and complete next page of RCA.  Have regular punctual attendance.  Practice awareness of current state of mind.  Practice meditation techniques daily.      Edson Lopez, MPS, LADC  
Patient should remain in isolation for a total of 14 days from time of discharge. Patient was diagnosed on 4/2/2020 and would be expected to complete isolation on or after 4/16/2020.    Those caring for the patient should maintain strict hand hygiene and avoid touching face as much as possible. If any family members develop shortness of breath or fevers they should contact their primary care provider as soon as possible.
ABRASION/FOREHEAD

## 2020-10-25 NOTE — ED ADULT NURSE NOTE - NS ED NURSE RECORD ANOTHER VITAL SIGN
"[facility] Labor and Delivery Progress Note    Jimena Irvin MRN# [id number]   Age: 31 year old YOB: 1988           Subjective:   Comfortable with epidural            Objective:     Patient Vitals for the past 24 hrs:   BP Temp Temp src Pulse Resp SpO2 Height Weight BMI (Calculated) Oximeter Heart Rate   10/25/20 0930 106/72 98.2  F (36.8  C) Oral -- 16 99 % -- -- -- 52 bpm   10/25/20 0915 101/68 -- -- -- -- 99 % -- -- -- 52 bpm   10/25/20 0900 106/70 -- -- -- -- 99 % -- -- -- 67 bpm   10/25/20 0845 103/70 -- -- -- -- 98 % -- -- -- 52 bpm   10/25/20 0830 106/73 98  F (36.7  C) Oral -- 16 98 % -- -- -- 51 bpm   10/25/20 0815 111/72 -- -- -- -- 99 % -- -- -- 54 bpm   10/25/20 0800 99/67 -- -- -- -- 99 % -- -- -- 49 bpm   10/25/20 0745 105/74 -- -- -- -- 99 % -- -- -- 59 bpm   10/25/20 0730 99/66 97.4  F (36.3  C) Oral -- 16 99 % -- -- -- 56 bpm   10/25/20 0715 (!) 87/55 -- -- -- 16 99 % -- -- -- 65 bpm   10/25/20 0700 94/54 -- -- -- 16 99 % -- -- -- 57 bpm   10/25/20 0655 109/75 -- -- -- 16 99 % -- -- -- 58 bpm   10/25/20 0650 108/73 -- -- -- 16 99 % -- -- -- 58 bpm   10/25/20 0645 107/74 -- -- -- 16 100 % -- -- -- 53 bpm   10/25/20 0644 106/73 -- -- -- 16 -- -- -- -- 53 bpm   10/25/20 0642 106/76 -- -- -- 16 -- -- -- -- 54 bpm   10/25/20 0640 102/69 -- -- -- 16 -- -- -- -- 65 bpm   10/25/20 0638 103/68 -- -- -- 16 -- -- -- -- 52 bpm   10/25/20 0637 103/65 -- -- -- 16 100 % -- -- -- 55 bpm   10/25/20 0630 (!) 86/61 97.8  F (36.6  C) Oral -- 16 94 % -- -- -- 60 bpm   10/25/20 0430 -- 98.1  F (36.7  C) Oral -- -- -- -- -- -- --   10/25/20 0330 -- 98  F (36.7  C) Oral -- -- -- -- -- -- --   10/25/20 0232 115/78 98.1  F (36.7  C) Oral 66 16 -- 1.778 m (5' 10\") 67.1 kg (148 lb) 21.24 --         Cervical Exam: 8/95%/+1     Position: Anterior  Cervix check done at 10:10     Membranes: Leaking     Fetal Heart Rate:    Monitor: (P) external US    Variability: (P) moderate (amplitude range 6 to 25 bpm) "    Baseline Rate: (P) normal range    Fetal Heart Rate Tracing: moderate variability    toco ctx's q 2-3 min          Assessment:   Jimena Irvin is a 31 year old  who is 39w4d here with SROM and active labor          Plan:   Expect     Magaly Nunn     Yes

## 2021-04-27 ENCOUNTER — NON-APPOINTMENT (OUTPATIENT)
Age: 71
End: 2021-04-27

## 2021-04-27 ENCOUNTER — APPOINTMENT (OUTPATIENT)
Dept: PULMONOLOGY | Facility: CLINIC | Age: 71
End: 2021-04-27
Payer: MEDICARE

## 2021-04-27 VITALS
HEART RATE: 75 BPM | TEMPERATURE: 97.9 F | OXYGEN SATURATION: 96 % | DIASTOLIC BLOOD PRESSURE: 80 MMHG | HEIGHT: 67 IN | SYSTOLIC BLOOD PRESSURE: 112 MMHG | WEIGHT: 140 LBS | BODY MASS INDEX: 21.97 KG/M2

## 2021-04-27 PROCEDURE — 71046 X-RAY EXAM CHEST 2 VIEWS: CPT

## 2021-04-27 PROCEDURE — 99214 OFFICE O/P EST MOD 30 MIN: CPT | Mod: 25

## 2021-04-27 PROCEDURE — 94010 BREATHING CAPACITY TEST: CPT

## 2021-04-29 NOTE — DISCUSSION/SUMMARY
[FreeTextEntry1] : i see no change with bd,\par \par this is 90% kyphoscoliosis and i think we'll get very little benefit from any kind of inhalers

## 2021-04-29 NOTE — PROCEDURE
[FreeTextEntry1] : chest film with continued loss of volume of the left lower lobe\par \par pfts with restriction and obstruction\par \par no change with bronchodilator

## 2021-04-29 NOTE — REVIEW OF SYSTEMS
[Cough] : no cough [Chest Tightness] : no chest tightness [Sputum] : no sputum [Dyspnea] : dyspnea [Pleuritic Pain] : no pleuritic pain [Negative] : Endocrine

## 2021-04-29 NOTE — PHYSICAL EXAM
[No Acute Distress] : no acute distress [Normal Oropharynx] : normal oropharynx [Normal Appearance] : normal appearance [No Neck Mass] : no neck mass [Normal Rate/Rhythm] : normal rate/rhythm [Normal S1, S2] : normal s1, s2 [No Murmurs] : no murmurs [No Resp Distress] : no resp distress [Kyphosis] : kyphosis [Normal Gait] : normal gait [Benign] : benign [No Clubbing] : no clubbing [No Cyanosis] : no cyanosis [No Edema] : no edema [FROM] : FROM [Normal Color/ Pigmentation] : normal color/ pigmentation [No Focal Deficits] : no focal deficits [Oriented x3] : oriented x3 [Normal Affect] : normal affect [TextBox_68] : decreased bs on the right,

## 2021-04-29 NOTE — HISTORY OF PRESENT ILLNESS
[TextBox_4] : tremendous  stress.  psychological stress.  deaths in the family from \par \par notes more dyspnea  has severe kyphoscoliosis\par \par also with forced inactivity due to the pandemic,\par \par when I saw him last I could find not reason for his dyspnea other than the kyphoscoliosis

## 2022-03-15 ENCOUNTER — NON-APPOINTMENT (OUTPATIENT)
Age: 72
End: 2022-03-15

## 2022-03-15 ENCOUNTER — APPOINTMENT (OUTPATIENT)
Dept: OPHTHALMOLOGY | Facility: CLINIC | Age: 72
End: 2022-03-15

## 2022-11-25 ENCOUNTER — EMERGENCY (EMERGENCY)
Facility: HOSPITAL | Age: 72
LOS: 1 days | Discharge: HOME | End: 2022-11-25
Admitting: EMERGENCY MEDICINE
Payer: MEDICARE

## 2022-11-25 VITALS
DIASTOLIC BLOOD PRESSURE: 53 MMHG | SYSTOLIC BLOOD PRESSURE: 133 MMHG | WEIGHT: 139.99 LBS | HEART RATE: 92 BPM | HEIGHT: 67 IN | TEMPERATURE: 98 F | RESPIRATION RATE: 18 BRPM | OXYGEN SATURATION: 95 %

## 2022-11-25 PROCEDURE — 99283 EMERGENCY DEPT VISIT LOW MDM: CPT | Mod: CS

## 2022-11-25 PROCEDURE — 99283 EMERGENCY DEPT VISIT LOW MDM: CPT

## 2022-11-25 RX ORDER — MOLNUPIRAVIR 200 MG/1
4 CAPSULE ORAL
Qty: 40 | Refills: 0
Start: 2022-11-25 | End: 2022-11-29

## 2022-11-25 NOTE — ED PROVIDER NOTE - NSFOLLOWUPINSTRUCTIONS_ED_ALL_ED_FT
It is likely that you have covid19 or another viral syndrome which means...  you need to isolate yourself at home for FIVE (5) days from onset of symptoms or from date of test if asymptomatic if your covid result is positive.  You should isolate pending results.  Your results will not come back for approximately 72 hours  If you have no symptoms or symptoms are improving after day 5 you can leave your house, but must wear a mask when around others for 5 more days.  If you have fevers after 5 days you must continue isolation until fever resolves    Please rest and remain well hydrated with plenty of fluids.  You can take motrin 600-800mg and tylenol 650mg every 3 hours, switching between the two for pain/bodyaches or fevers (>100.4F/>38C)    Call to arrange follow up with your primary care doctor.    If your covid test is positive, ou can call to schedule outpatient monoclonal antibodies if interested [phone]    Return to ED if you have chest pain, difficulty breathing, vomiting, abdominal pain, fainting or other concerns

## 2022-11-25 NOTE — ED PROVIDER NOTE - CLINICAL SUMMARY MEDICAL DECISION MAKING FREE TEXT BOX
71 yo M with pmh of afib on xarelto, hypothyroidism, HLD, HTN c/o cough, bodyaches, chills, weakness, fatigue and diarrhea for the past day and a half. Feeling slightly better than last night. Pt  went to urgent care and tested positive for covid. Pt was told to come to the ED for MAB because he was not a candidate for paxlovid. Denies cp, sob. VSs. Well appearing. MAB no longer being given, offered molnupiravir and pt would like to take it.

## 2022-11-25 NOTE — ED PROVIDER NOTE - PATIENT PORTAL LINK FT
You can access the FollowMyHealth Patient Portal offered by [facility] by registering at the following website: [email or web address]. By joining [facility]’s FollowMyHealth portal, you will also be able to view your health information using other applications (apps) compatible with our system.

## 2022-11-25 NOTE — ED ADULT NURSE NOTE - OBJECTIVE STATEMENT
COVID positive at [facility], had chilling, nausea, fever, headache yesterday. denies all symptoms now. A&OX4, stable at chair.

## 2022-11-25 NOTE — ED PROVIDER NOTE - OBJECTIVE STATEMENT
71 yo M with pmh of afib on xarelto, hypothyroidism, HLD, HTN c/o cough, bodyaches, chills, weakness, fatigue and diarrhea for the past day and a half. Feeling slightly better than last night. Pt  went to urgent care and tested positive for covid. Pt was told to come to the ED for MAB because he was not a candidate for paxlovid. Denies cp, sob.

## 2022-11-25 NOTE — ED ADULT TRIAGE NOTE - CHIEF COMPLAINT QUOTE
Pt COVID+ today, sx onset yesterday. Went to [facility] for paxlovid and was told he cannot get it because he takes xarelto. Sent here for MAB.

## 2022-11-27 DIAGNOSIS — U07.1 COVID-19: ICD-10-CM

## 2022-11-27 DIAGNOSIS — I48.91 UNSPECIFIED ATRIAL FIBRILLATION: ICD-10-CM

## 2022-11-27 DIAGNOSIS — R05.9 COUGH, UNSPECIFIED: ICD-10-CM

## 2022-11-27 DIAGNOSIS — Z88.8 ALLERGY STATUS TO OTHER DRUGS, MEDICAMENTS AND BIOLOGICAL SUBSTANCES: ICD-10-CM

## 2022-11-27 DIAGNOSIS — I10 ESSENTIAL (PRIMARY) HYPERTENSION: ICD-10-CM

## 2022-11-27 DIAGNOSIS — E03.9 HYPOTHYROIDISM, UNSPECIFIED: ICD-10-CM

## 2022-11-27 DIAGNOSIS — E78.5 HYPERLIPIDEMIA, UNSPECIFIED: ICD-10-CM

## 2022-11-27 DIAGNOSIS — Z79.01 LONG TERM (CURRENT) USE OF ANTICOAGULANTS: ICD-10-CM

## 2023-01-09 NOTE — ED ADULT NURSE NOTE - NS ED NURSE LEVEL OF CONSCIOUSNESS MENTAL STATUS
Changes In Treatment Protocol: Holding at 875mj per Aleshia DHILLON; 3 x weekly Protocol For Uva1: The patient received UVA1. Protocol For Photochemotherapy: Mineral Oil And Broad Band Uvb: The patient received Photochemotherapy: Mineral Oil and Broad Band UVB. Protocol For Photochemotherapy: Tar And Nbuvb (Goeckerman Treatment): The patient received Photochemotherapy: Tar and NBUVB (Goeckerman treatment). Protocol For Photochemotherapy For Severe Photoresponsive Dermatoses: Puva: The patient received Photochemotherapy for severe photoresponsive dermatoses: PUVA requiring at least 4 to 8 hours of care under direct physician supervision. Protocol For Photochemotherapy: Triamcinolone Ointment And Nbuvb: The patient received Photochemotherapy: Triamcinolone and NBUVB (triamcinolone ointment applied to all lesions prior to phototherapy). Post-Care Instructions: I reviewed with the patient in detail post-care instructions. Patient is to wear sun protection. Patients may expect sunburn like redness, discomfort and scabbing. Protocol For Broad Band Uvb: The patient received Broad Band UVB. Name Of Supervising Technician: MA Protocol For Photochemotherapy For Severe Photoresponsive Dermatoses: Tar And Broad Band Uvb (Goeckerman Treatment): The patient received Photochemotherapy for severe photoresponsive dermatoses: Tar and Broad Band UVB (Goeckerman treatment) requiring at least 4 to 8 hours of care under direct physician supervision. Protocol For Bath Puva: The patient received Bath PUVA. Protocol For Photochemotherapy: Petrolatum And Nbuvb: The patient received Photochemotherapy: Petrolatum and NBUVB (petrolatum applied to all lesions prior to phototherapy). Protocol For Photochemotherapy For Severe Photoresponsive Dermatoses: Tar And Nbuvb (Goeckerman Treatment): The patient received Photochemotherapy for severe photoresponsive dermatoses: Tar and NBUVB (Goeckerman treatment) requiring at least 4 to 8 hours of care under direct physician supervision. Protocol For Photochemotherapy: Tar And Broad Band Uvb (Goeckerman Treatment): The patient received Photochemotherapy: Tar and Broad Band UVB (Goeckerman treatment). Total Body Energy: 875mj Protocol For Photochemotherapy For Severe Photoresponsive Dermatoses: Petrolatum And Broad Band Uvb: The patient received Photochemotherapyfor severe photoresponsive dermatoses: Petrolatum and Broad Band UVB requiring at least 4 to 8 hours of care under direct physician supervision. Protocol For Photochemotherapy: Baby Oil And Nbuvb: The patient received Photochemotherapy: Baby Oil and NBUVB (baby oil applied to all lesions prior to phototherapy). Protocol For Protocol For Photochemotherapy For Severe Photoresponsive Dermatoses: Bath Puva: The patient received Photochemotherapy for severe photoresponsive dermatoses: Bath PUVA requiring at least 4 to 8 hours of care under direct physician supervision. Protocol For Photochemotherapy For Severe Photoresponsive Dermatoses: Petrolatum And Nbuvb: The patient received Photochemotherapy for severe photoresponsive dermatoses: Petrolatum and NBUVB requiring at least 4 to 8 hours of care under direct physician supervision. Protocol: NBUVB Detail Level: Generalized Render Post-Care In The Note: no Protocol For Uva: The patient received UVA. Protocol For Photochemotherapy: Petrolatum And Broad Band Uvb: The patient received Photochemotherapy: Petrolatum and Broad Band UVB. Protocol For Nbuvb: The patient received NBUVB. Protocol For Puva: The patient received PUVA. Comments On Previous Treatment: No burning, no change in meds, wears goggles Protocol For Photochemotherapy: Mineral Oil And Nbuvb: The patient received Photochemotherapy: Mineral Oil and NBUVB (mineral oil applied to all lesions prior to phototherapy). Consent: Written consent obtained.  The risks were reviewed with the patient including but not limited to: burn, pigmentary changes, pain, blistering, scabbing, redness, increased risk of skin cancers, and the remote possibility of scarring. Protocol For Nb Uva: The patient received NB UVA. Awake/Alert

## 2023-01-16 NOTE — ED PROVIDER NOTE - CADM POA URETHRAL CATHETER
From: Lidia Tran  To: Maria Dolores Francois  Sent: 1/14/2023 3:13 PM CST  Subject: Flu Shot    Hello,    I was able to find proof of my flu vaccination can it be put on my record?    Thank you  
No

## 2023-09-08 ENCOUNTER — APPOINTMENT (OUTPATIENT)
Dept: PULMONOLOGY | Facility: CLINIC | Age: 73
End: 2023-09-08
Payer: MEDICARE

## 2023-09-08 VITALS
OXYGEN SATURATION: 96 % | BODY MASS INDEX: 21.97 KG/M2 | DIASTOLIC BLOOD PRESSURE: 86 MMHG | HEART RATE: 86 BPM | HEIGHT: 67 IN | TEMPERATURE: 98.1 F | SYSTOLIC BLOOD PRESSURE: 130 MMHG | WEIGHT: 140 LBS

## 2023-09-08 PROCEDURE — 99213 OFFICE O/P EST LOW 20 MIN: CPT

## 2023-09-08 RX ORDER — METHYLPREDNISOLONE 4 MG/1
4 TABLET ORAL
Qty: 1 | Refills: 0 | Status: ACTIVE | COMMUNITY
Start: 2023-09-08 | End: 1900-01-01

## 2023-09-09 NOTE — PHYSICAL EXAM
[No Acute Distress] : no acute distress [Normal Oropharynx] : normal oropharynx [Normal Appearance] : normal appearance [No Neck Mass] : no neck mass [Normal Rate/Rhythm] : normal rate/rhythm [Normal S1, S2] : normal s1, s2 [No Murmurs] : no murmurs [No Resp Distress] : no resp distress [Kyphosis] : kyphosis [Benign] : benign [Normal Gait] : normal gait [No Clubbing] : no clubbing [No Cyanosis] : no cyanosis [No Edema] : no edema [FROM] : FROM [Normal Color/ Pigmentation] : normal color/ pigmentation [No Focal Deficits] : no focal deficits [Oriented x3] : oriented x3 [Normal Affect] : normal affect [TextBox_68] : decreased bs on the right,

## 2023-09-09 NOTE — REVIEW OF SYSTEMS
[Fever] : no fever [Fatigue] : no fatigue [Cough] : cough [Chest Tightness] : no chest tightness [Sputum] : no sputum [Dyspnea] : dyspnea [Pleuritic Pain] : no pleuritic pain [Negative] : Endocrine

## 2023-09-09 NOTE — HISTORY OF PRESENT ILLNESS
[TextBox_4] : one week of cough frequent cough placed on benzonatate covid negative got a stent  patient actually has no true lung disease, only sevdre kyphoscoliosis which affects his breathing has what is clearly a virus with a severe cough, but it's only for a week he is covid negative cough is no productive no fever

## 2023-09-09 NOTE — DISCUSSION/SUMMARY
[FreeTextEntry1] : medrol dose pack to attenuate the cough  explained to him that this appear to be a simple virus with a cough

## 2023-10-30 ENCOUNTER — NON-APPOINTMENT (OUTPATIENT)
Age: 73
End: 2023-10-30

## 2023-10-31 ENCOUNTER — APPOINTMENT (OUTPATIENT)
Dept: PULMONOLOGY | Facility: CLINIC | Age: 73
End: 2023-10-31
Payer: MEDICARE

## 2023-10-31 VITALS
TEMPERATURE: 98.1 F | WEIGHT: 140 LBS | DIASTOLIC BLOOD PRESSURE: 77 MMHG | HEART RATE: 77 BPM | HEIGHT: 67 IN | SYSTOLIC BLOOD PRESSURE: 133 MMHG | OXYGEN SATURATION: 97 % | BODY MASS INDEX: 21.97 KG/M2

## 2023-10-31 DIAGNOSIS — R93.89 ABNORMAL FINDINGS ON DIAGNOSTIC IMAGING OF OTHER SPECIFIED BODY STRUCTURES: ICD-10-CM

## 2023-10-31 DIAGNOSIS — R91.1 SOLITARY PULMONARY NODULE: ICD-10-CM

## 2023-10-31 PROCEDURE — 99211 OFF/OP EST MAY X REQ PHY/QHP: CPT

## 2023-11-21 ENCOUNTER — OUTPATIENT (OUTPATIENT)
Dept: OUTPATIENT SERVICES | Facility: HOSPITAL | Age: 73
LOS: 1 days | End: 2023-11-21
Payer: MEDICARE

## 2023-11-21 ENCOUNTER — APPOINTMENT (OUTPATIENT)
Dept: CT IMAGING | Facility: HOSPITAL | Age: 73
End: 2023-11-21

## 2023-11-21 PROCEDURE — 71250 CT THORAX DX C-: CPT

## 2023-11-21 PROCEDURE — 71250 CT THORAX DX C-: CPT | Mod: 26,MH

## 2024-01-08 ENCOUNTER — APPOINTMENT (OUTPATIENT)
Dept: OTOLARYNGOLOGY | Facility: CLINIC | Age: 74
End: 2024-01-08

## 2024-01-09 ENCOUNTER — APPOINTMENT (OUTPATIENT)
Dept: PULMONOLOGY | Facility: CLINIC | Age: 74
End: 2024-01-09
Payer: MEDICARE

## 2024-01-09 PROCEDURE — 99212 OFFICE O/P EST SF 10 MIN: CPT

## 2024-01-30 LAB
RAPID RVP RESULT: NOT DETECTED
SARS-COV-2 RNA PNL RESP NAA+PROBE: NOT DETECTED

## 2024-05-09 ENCOUNTER — APPOINTMENT (OUTPATIENT)
Dept: PULMONOLOGY | Facility: CLINIC | Age: 74
End: 2024-05-09
Payer: MEDICARE

## 2024-05-09 VITALS
WEIGHT: 140 LBS | BODY MASS INDEX: 21.97 KG/M2 | HEIGHT: 67 IN | SYSTOLIC BLOOD PRESSURE: 130 MMHG | TEMPERATURE: 98.1 F | DIASTOLIC BLOOD PRESSURE: 78 MMHG | HEART RATE: 77 BPM | OXYGEN SATURATION: 98 %

## 2024-05-09 PROCEDURE — 99212 OFFICE O/P EST SF 10 MIN: CPT | Mod: 25

## 2024-05-09 PROCEDURE — 71046 X-RAY EXAM CHEST 2 VIEWS: CPT

## 2024-05-09 RX ORDER — AZITHROMYCIN 250 MG/1
250 TABLET, FILM COATED ORAL
Qty: 1 | Refills: 1 | Status: ACTIVE | COMMUNITY
Start: 2024-05-09 | End: 1900-01-01

## 2024-05-10 NOTE — REVIEW OF SYSTEMS
[Fever] : no fever [Fatigue] : no fatigue [Cough] : cough [Anxiety] : anxiety [Negative] : Endocrine [TextBox_30] : cough at night [TextBox_119] : thinks hes losing his memory

## 2024-05-10 NOTE — DISCUSSION/SUMMARY
[FreeTextEntry1] : can't tell if hes hypochondriacal or his wife is just pressing him to not cough he simply doesn't understand that have a cough for four days i part of the human experience i took a chest film just to make his realize he has a simply cold and gave him azithromycin

## 2024-05-10 NOTE — HISTORY OF PRESENT ILLNESS
[TextBox_4] : cough, has fever up and down has a cough for fourdays  has been been up  not bringin things up and down tells me his wife is upset about him coughing at night in the past I have told him that four days of coughing does not require a doctor visit and he clearly has ignored this.

## 2024-05-24 NOTE — ED ADULT NURSE NOTE - CHIEF COMPLAINT QUOTE
End of Shift Note    Pertinent Events this Shift: VSS, Pt on RA, No acute events, sitter remains at bedside. Angiogram still pending.     Nursing Concerns: None     Pt/Family concerns: Oral intake. Previous diet re-ordered.     Mental Status: A/O x3-4, FC.     Mobility: Minimal assist. X1 OOB.     Barriers towards goals/Discharge: Not medically ready.         Dolores Sumner RN     Pt COVID+ today, sx onset yesterday. Went to [facility] for paxlovid and was told he cannot get it because he takes xarelto. Sent here for MAB.

## 2024-07-28 NOTE — ED PROVIDER NOTE - CROS ED CARDIOVAS ALL NEG
[facility]  HOSPITAL MEDICINE DISCHARGE SUMMARY NOTE   PATIENT: Holland Sparks  TODAY'S DATE: 7/28/2024    YOB: 1938  ADMISSION DATE: 7/25/2024    MRN: [id number]  INPATIENT LOS: 2    ROOM: [facility]  HOSPITAL DAY: Hospital Day: 4          Discharge Summary   Patient ID:   Holland Sparks   [id number]   86 year old   1938   Admit date: 7/25/2024   Discharge date: 7/28/2024   Admitting Physician: Myah Jack MD   Discharge Physician: No att. providers found     Primary Diagnoses:   Principal Problem (Resolved):    AMS (altered mental status)  Active Problems:    * No active hospital problems. *     Secondary Diagnoses:   Past Medical History:   Diagnosis Date    Bell's palsy     Coronary atherosclerosis due to severely calcified coronary lesion     Diabetes 1.5, managed as type 2  (CMD)     Diabetes mellitus, type II  (CMD)     Essential hypertension, malignant     Hyperlipemia     Osteoarthritis of knee, unspecified     Rheumatoid aortitis     Sensory hearing loss, bilateral         Hospital Course By Problem List (see H&P for details of admission):       Holland Sparks is a 86 year old male with pmhx of rheumatoid arthritis, lung ca, htn, hld, cad, dm2 who presented to the ED with acute toxic metabolic encephalopathy with transient neurologic deficit, likely secondary to focal seizure, pt seen by neurology, clinically improved and being DC home with outpatient follow up. Further details as below.    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 Consider MRI brain for further evaluation.  - MRA head with Flow related enhancement in the distal vertebral arteries, basilar artery, posterior cerebral arteries, anterior cerebral arteries, middle cerebral  arteries and distal internal carotid arteries. The flow related enhancement appears somewhat asymmetrically decreased on the left however there is no proximal large vessel occlusion identified.Focal short segment stenosis right P2 segment of the posterior cerebral artery series 350 image 7.  - MRI brain with No acute intracranial abnormality.Atrophy and chronic ischemic change.Right maxillary and sphenoid sinusitis.  - MRA neck with  Allowing for motion artifact, no high-grade arterial stenosis in the neck.  - EEG with This EEG was abnormal. The mild background slowing suggests mild encephalopathy but may also represent excessive drowsiness.   - echo with Left ventricular ejection fraction; 50 %.Akinesis of the basal to mid inferior wall and hypokinesis of the basal to mid inferolateral wall.Agitated saline was injected through a peripheral vein and did not show evidence of a shunt. LA externally compressed likely by descending aorta (known to be severely dilated).No pericardial effusion.No apical thrombus.  - trop neg on admission  - does not appear to be infectious etiology  - seen by neurology, Dr. Potts, continue levetiracetam, outpatient follow up  - seizure precautions     CAD s/p PCI, HTN essential, HLD, ischemic cardiomyopathy, aortic stenosis -   - continue current medications  - echo as above     Anemia, microcytic - likely chronic anemia of chronic disease, hgb stable, continue current management and monitoring     Acute kidney injury - likely pre-renal etiology  - Cr 1.23 --->>0.96  - renally dose medications, avoid nephrotoxic medications  RESOLVED     DM2 with vascular complications, not on long term  insulin use, hyperglycemia - hgba1c: 6.4; resume home medications     NSCLC s/p definitive SBRT in  -   - on observation     GERD unspecified - on PPI    Consults   IP Consult Orders (From admission, onward)       Start     Ordered    24  Inpatient consult to Neurology  ONE TIME        Provider:  (Not yet assigned)    24  Inpatient consult to Physical Medicine & Rehab  ONE TIME        Provider:  (Not yet assigned)    24                     Procedures performed TRANSTHORACIC ECHO (TTE) LIMITED W/ W/O IMAGING AGENT    Result Date: 2024  Final Impressions   * Limited echo.   * No prior study available for comparison.   * Left ventricular ejection fraction; 50 %.   * Akinesis of the basal to mid inferior wall and hypokinesis of the basal to mid inferolateral wall.   * Agitated saline was injected through a peripheral vein and did not show evidence of a shunt.   * LA externally compressed likely by descending aorta (known to be severely dilated).   * No pericardial effusion.   * No apical thrombus. Patient Info Name:     Holland Sparks Age:     86 years :     1938 Gender:     Male MRN:     [id number] Accession #:     [id number] Ht:     183 cm Wt:     84 kg BSA:     2.07 m2 HR:     70 bpm BP:     157 /     87 mmHg Heart Rhythm:     Sinus Arrhythmia Technical Quality:     Fair Exam Date:     2024 10:07 AM Exam Room:     [address] [place] [address] Patient Status:     U Exam Type:     TRANSTHORACIC ECHO (TTE) LIMITED W/ W/O IMAGING AGENT Exam Location:     [facility] Study Info Indications     Stroke - Limited echo or Follow up with Definity contrast. Contrast/Agitated Saline ------------------------------ Contrast/Ag. Saline:     Definity Amount:     2.00 ml ------------------------------ Contrast/Ag. Saline:     Agitated Saline Amount:     20.00 ml Staff Sonographer:     Clau Zamarripa RDCS, RDMS, RVT Ordering Physician:     Myah Jack Referring Physician:     Myah VARGAS  Guero Left Ventricle   Mildly increased left ventricular chamber size. Left ventricular ejection fraction; 50 %. Akinesis of the basal to mid inferior wall and hypokinesis of the basal to mid inferolateral wall. No apical thrombus. Right Ventricle   Normal right ventricular size and systolic function. LV EF:     50 % Left Atrium   LA externally compressed likely by descending aorta (known to be severely dilated). Other Findings   Definity contrast administered to better visualize endocardial definition. Agitated saline was injected through a peripheral vein and did not show evidence of a shunt. Pericardium/Pleural   No pericardial effusion. Ventricles ---------------------------------------------------------------------- Name                                 Value        Normal ---------------------------------------------------------------------- LV Fractional Shortening/Ejection Fraction 2D/MM ----------------------------------------------------------------------  LV Diastolic Volume (BP MOD)                                184 ml         LV Systolic Volume (BP MOD)         107 ml         21-61 LV EF (BP MOD)                        50 %         52-72 Report Signatures Finalized by Frank Hernandez on 07/26/2024 12:35 PM    EEG - routine    Result Date: 7/26/2024   EEG REPORT Patient: Holland Sparks   Age: 86 year old  MRN: [id number]  Testing Date: 7/26/2024  Ordering Physician: Dr. Jack Introduction: This 21-channel standard 10-20 electrode routine 30 minute EEG was recorded for a 86 year old male with a history of transient neurologic deficit. This EEG was performed to evaluate for focal or epileptiform abnormalities. The patient was not on neuroactive medications. The patient was not intentionally sleep deprived. Single lead EKG monitoring as well as infraorbital electrodes were included. Description: The background activity during maximal recorded wakefulness consisted of a bioccipitally dominant 7 Hz,  symmetric, regular activity that was reactive to eye opening. During drowsiness, there was attenuation of the waking background and diffuse theta slowing. Stage II sleep was not recorded. Activation was performed, including photic stimulation.  Photic stimulation - Photic stimulation at 2-30 Hz was performed and there was a biposterior, symmetric, driving response. No abnormalities were activated by photic stimulation. There were no epileptiform discharges, focal abnormalities, or seizures recorded during the EEG. The EKG channel demonstrated a normal sinus rhythm. Interpretation This EEG was abnormal in wakefulness and drowsiness. There was mild background slowing. Clinical correlation This EEG was abnormal. The mild background slowing suggests mild encephalopathy but may also represent excessive drowsiness. Gio Keating MD Neurologist/ Epileptologist      XR CHEST AP OR PA    Result Date: 7/26/2024  EXAM:  XR CHEST AP OR PA CLINICAL HISTORY:  evalution for infectious process Eval for infectious process TECHNIQUE:  XR CHEST AP OR PA. COMPARISON: December 2021.  FINDINGS: The heart is enlarged. The thoracic aorta is tortuous. There is mild blunting of the left costophrenic angle. The right lungs are clear. No evidence for pneumothorax. Advanced degenerative change and rotator cuff arthropathy bilateral glenohumeral joints. Degenerative changes of the thoracic spine. Electronically Signed by: Albert Mcginnis MD Signed on: 7/26/2024 7:07 AM Created on Workstation ID: [id number] Signed on Workstation ID: [id number]    MRA HEAD WO CONTRAST    Result Date: 7/25/2024  EXAM: MRA HEAD WO CONTRAST INDICATION: Stroke like symptoms. Neurologic deficit with altered mental status COMPARISON: Brain MRI earlier today TECHNIQUE: MR angiogram head without IV contrast. Three-D reformatted images were reconstructed on a separate workstation.     FINDINGS/IMPRESSION: Flow related enhancement in the distal vertebral arteries, basilar  artery, posterior cerebral arteries, anterior cerebral arteries, middle cerebral arteries and distal internal carotid arteries. The flow related enhancement appears somewhat asymmetrically decreased on the left however there is no proximal large vessel occlusion identified. Focal short segment stenosis right P2 segment of the posterior cerebral artery series 350 image 7. Electronically Signed by: Wilian Thomas MD Signed on: 7/25/2024 5:21 PM Created on Workstation ID: [id number] Signed on Workstation ID: [id number]    MRA NECK W WO CONTRAST    Result Date: 7/25/2024  EXAM: MRA NECK W WO CONTRAST INDICATION: Altered mental status. Question stroke COMPARISON: MRI and MRA brain earlier today TECHNIQUE: MR angiogram of the neck performed with and without intravenous contrast. 3-D reformatted images were reconstructed on a separate workstation. NASCET criteria were used. FINDINGS: Motion limited exam. The aortic arch was not well assessed. No definite common carotid artery stenosis. No high-grade stenosis at the carotid artery bifurcations. The internal carotid arteries are patent without significant stenosis to their termini. Antegrade flow is present in both vertebral arteries with the left vertebral artery the dominant vessel. No significant vertebral artery stenosis identified. The basilar artery is patent.     IMPRESSION: 1. Allowing for motion artifact, no high-grade arterial stenosis in the neck. Electronically Signed by: Wilian Thomas MD Signed on: 7/25/2024 5:20 PM Created on Workstation ID: [id number] Signed on Workstation ID: [id number]    MRI BRAIN WO CONTRAST    Result Date: 7/25/2024  EXAM: MRI BRAIN WO CONTRAST INDICATION: Altered mental status COMPARISON: Head CT earlier today TECHNIQUE: MRI brain without gadolinium FINDINGS: Motion limited exam. Given this limitation the diffusion weighted imaging is negative for acute or subacute ischemia. Generalized cerebral atrophy and mild ex vacuo dilatation of  the ventricles. No midline shift. No intracranial hemorrhage or mass effect. Moderate T2/FLAIR hyperintensities in the periventricular and subcortical white matter consistent with chronic small vessel ischemic change. Prominent inflammatory changes in the right maxillary sinus. Pannus formation in the C1-C2 articulation. Cerebellar tonsils are normally located. Partially visualized suspected interbody fusion at C4-5. Opacification left sphenoid sinus. Orbital structures are unremarkable.     IMPRESSION: 1. No acute intracranial abnormality. 2. Atrophy and chronic ischemic change. 3. Right maxillary and sphenoid sinusitis. Electronically Signed by: Wilian Thomas MD Signed on: 7/25/2024 5:03 PM Created on Workstation ID: [id number] Signed on Workstation ID: [id number]    CT HEAD LEVEL 1    Result Date: 7/25/2024  EXAM:  CT HEAD LEVEL 1 TECHNIQUE:  CT HEAD LEVEL 1.  [ ]  Automated exposure control, adjustment of the mA and/or kV according to patient size, and/or use of iterative reconstruction.  CLINICAL HISTORY:  Neuro deficit, acute, stroke suspected COMPARISON: None.  FINDINGS:  There is global bilateral cerebral convexity volume loss with involutional changes of aging. There is associated periventricular white matter change, in a pattern most consistent with that of chronologic microvascular leukoencephalomalacia. The gray-white differentiation pattern involving the bilateral cerebral hemispheres is preserved. There is no evidence for obscuration of the insular ribbon. The gray-white interface involving the deep gray matter structures including the bilateral caudate, internal/external capsules, and thalami are preserved. There is no evidence for extra-axial fluid collection or mass involving the supra or infratentorial compartments. Evaluation of the posterior fossa structures, including the pontomedullary structures are grossly normal allowing for limitations of noncontrast CT scan. Left sphenoid sinusitis. Right  maxillary sinus is completely opacified. Mild to moderate inflammatory change of the bilateral ethmoid air cells. The globes, retroconal soft tissues, and periorbital muscles are normal. Calcifications involving the posterior globes at the level of the optic nerve bilaterally 12/47. The bony calvarium is intact.     IMPRESSION: Bilateral cerebral convexity parenchymal volume loss and involutional changes of aging. Age-related periventricular white matter change in a pattern most consistent with that of microvascular leukoencephalomalacia. No evidence for acute intracranial hemorrhage, mass, or shift. Due to the moderate degree of periventricular white matter change, evaluation for subtle findings of early acute ischemia is limited on the basis of this exam. Consider MRI brain for further evaluation. Results communicated to managing/ordering provider at 7/25/2024 3:19 PM. In addition, please refer to imaging study in PACs, when relevant, pertinent images and findings are labeled and stored in PACS, reference Montage series A1. Electronically Signed by: Albert Mcginnis MD Signed on: 7/25/2024 3:22 PM Created on Workstation ID: [id number] Signed on Workstation ID: [id number]       Discharge Exam   Blood pressure (!) 141/87, pulse 82, temperature 97.5 °F (36.4 °C), temperature source Oral, resp. rate 16, height 6' (1.829 m), weight 84.3 kg (185 lb 13.6 oz), SpO2 97%.   General: A&O x3, in NAD   Lungs: ctabl   Heart: rrr, s1s2  +bs, soft, nt/nd  No LE edema b/l     Activity: as tolerated   Diet: cardiac, carb consistent   Code Status: Full Resuscitation     Pending issues to be followed up by PCP   Follow up with neurology     New Medications Started During Hospitalization: keppra    Changed medications during Hospitalization: none    Medications Discontinued During Hospitalization: none    Discharge Medication List:    Refer to AVS for full list of discharge medications.         Summary of your Discharge Medications         Take these Medications        Details   Accu-Chek Quin Plus test strip   Generic drug: blood glucose  Use 1 strip for testing blood twice a week for checking blood sugar     acetaminophen 325 MG tablet  Commonly known as: TYLENOL   Take 650 mg by mouth every 4 hours as needed for Pain.     ascorbic acid 500 MG tablet  Commonly known as: VITAMIN C   Take 1,000 mg by mouth daily.     brimonidine 0.15 % ophthalmic solution  Commonly known as: ALPHAGAN P   Place 1 drop into both eyes in the morning and 1 drop at noon and 1 drop in the evening.     cetirizine 10 MG tablet  Commonly known as: ZyrTEC   Take 10 mg by mouth daily.     Cholecalciferol 50 mcg (2,000 units) tablet   Take 2,000 Units by mouth daily.     diclofenac 1 % gel  Commonly known as: VOLTAREN   Apply 2 g topically 4 times daily as needed (pain). Apply to hand     DISPENSE   Take 1 tablet by mouth daily. Super Beet chewable     ezetimibe 10 MG tablet  Commonly known as: ZETIA   Take 1 tablet by mouth daily.     ferrous sulfate 325 (65 FE) MG tablet   Take 325 mg by mouth in the morning and 325 mg at noon and 325 mg in the evening. Take with meals.     Fiber 625 MG Tab   Take 625 mg by mouth daily.     fluticasone 50 MCG/ACT nasal spray  Commonly known as: FLONASE   Spray 2 sprays in each nostril daily as needed (runny nose).     folic acid 1 MG tablet  Commonly known as: FOLATE   Take 1 mg by mouth daily.     ibuprofen 200 MG tablet  Commonly known as: MOTRIN   Take 400 mg by mouth every 6 hours as needed for Pain.     levETIRAcetam 250 MG tablet  Commonly known as: KepPRA   Take 1 tablet by mouth every 12 hours.     losartan 50 MG tablet  Commonly known as: COZAAR   Take 25 mg by mouth daily.     melatonin 3 MG   Take 6 mg by mouth nightly.     metformin 1000 MG tablet  Commonly known as: GLUCOPHAGE   Take 1,000 mg by mouth 2 times daily (with meals).     Omega 3-6-9 Complex capsule   Take 1 capsule by mouth daily.     pantoprazole 40 MG  tablet  Commonly known as: PROTONIX   Take 40 mg by mouth daily.     vitamin B-12 1000 MCG tablet  Commonly known as: CYANOCOBALAMIN   Take 830 mcg by mouth daily.               Disposition: patient is being discharged to home     Follow-up with:   Rebecca Capps APNP  [address] [facility] [address]  [phone]    Follow up in 3 day(s)  Post hospital follow up within 3 days of discharge    Torey Potts MD  [address] [id number]  [phone]    Follow up in 2 week(s)         I spent 45 minutes in discharge day activities, including the final exam of the patient, medication management, and instructions for continuing care      In the interest of transparency, the 21st Century Cures Act requires healthcare organizations to make nearly all medical information readily available to patients. Please remember that this document is intended as a form of “bxye-ok-dahl” communication. Often medical records contain verbiage and abbreviations that might be unfamiliar and without context. Language may appear direct as it is intended to succinctly relay the clinical opinion of the practitioner.      Signed:   Val Stewart MD   7/28/2024   12:49 PM        - - -

## 2024-09-03 NOTE — PROGRESS NOTE ADULT - PROBLEM SELECTOR PLAN 1
Anesthesia Pre Eval Note    Anesthesia ROS/Med Hx    Overall Review:  EKG was reviewed     Anesthetic Complication History:    Patient does not have a history of anesthetic complications      Cardiovascular Review:     Positive for pacemaker   Positive for CAD  Positive for hypertension  Positive for hyperlipidemia  Additional Results:  EKG:  No results found for this or any previous visit (from the past 4464 hour(s)).    ALLERGIES:   -- Penicillins -- Other (See Comments)    --  Tolerates cephalosporins   No results found for: \"WBC\", \"RBC\", \"HGB\", \"HCT\", \"MCV\", \"MCH\", \"MCHC\", \"RDWCV\", \"SODIUM\", \"POTASSIUM\", \"CHLORIDE\", \"CO2\", \"GLUCOSE\", \"BUN\", \"CREATININE\", \"GFRESTIMATE\", \"EGFRNONAFR\", \"GFRA\", \"GFRNA\", \"CALCIUM\", \"HCG\", \"PLT\", \"PTT\", \"INR\"   Patient Vitals for the past 24 hrs:   BP Temp Temp src Pulse Resp SpO2 Height Weight   09/03/24 0914 (!) 155/71 -- -- -- -- -- -- --   09/03/24 0854 (!) 149/72 -- -- -- -- -- -- --   09/03/24 0829 (!) 166/77 36.4 °C (97.5 °F) Temporal (!) 60 18 100 % 6' 3\" (1.905 m) 81.6 kg (180 lb)           Relevant Problems   No relevant active problems       Physical Exam     Airway   Mallampati: II  TM Distance: >3 FB  Neck ROM: Full    Cardiovascular  Cardiovascular exam normal  Cardio Rhythm: Regular  Cardio Rate: Normal    General Assessment  General Assessment: Alert and oriented and No acute distress    Dental Exam  Dental exam normal    Pulmonary Exam  Pulmonary exam normal  Breath sounds clear to auscultation:  Yes  Patient Demonstrates:  Non-labored Breathing    Abdominal Exam  Abdominal exam normal      Anesthesia Plan:    ASA Status: 3  Anesthesia Type: MAC    Checklist  Reviewed: Lab Results, EKG and Allergies  Consent/Risks Discussed Statement:  The proposed anesthetic plan, including its risks and benefits, have been discussed with the Patient along with the risks and benefits of alternatives. Questions were encouraged and answered and the patient and/or representative  understands and agrees to proceed.        I discussed with the patient (and/or patient's legal representative) the risks and benefits of the proposed anesthesia plan, MAC, which may include services performed by other anesthesia providers.    Alternative anesthesia plans, if available, were reviewed with the patient (and/or patient's legal representative). Discussion has been held with the patient (and/or patient's legal representative) regarding risks of anesthesia, which include intra-operative awareness and emergent situations that may require change in anesthesia plan.    The patient (and/or patient's legal representative) has indicated understanding, his/her questions have been answered, and he/she wishes to proceed with the planned anesthetic.     Pt presented with new onset Afib, unknown time of onset. 's in ED s/p Cardizem 20 mg IV and 30 mg PO with improvement in HR to 90's. Cardizem increased to 60 mg every 6 hours for better rate control.  - Ep following; RAMON today without LA thrombus but failed DCCV. Per EP recs   - Better rate controlled overnight with increase in Cardizem dose. Continue Cardizem 60 mg every 6 hours but will likely transition to BB on discharge per Dr. Epperson.   - Etiology likely 2/2 over treatment of hypothyroidism as labs show suppressed TSH and elevated total and free T4. f/u Endo recs.   - TSH very low 0.009- likely over medicated on Synthroid and possible etiology of rapid afib. f/u Free T3, T4.  On synthroid 200 mcg at home. Will reduce to 150 mcg daily.  - Trops negative x 3.  No concern for ischemia. Echo without wall motion abnormalities, EF 45-50% likely rate related as patient tachycardic during exam. Repeat Echo once better rate controlled.   -- Mcahw2ermj 1.  Continue with heparin gtt. f/u PTT (goal 60-80). Guiac negative in ED.  Transition to Xarelto 20  mg daily if covered by insurance.

## 2024-12-06 ENCOUNTER — EMERGENCY (EMERGENCY)
Facility: HOSPITAL | Age: 74
LOS: 1 days | Discharge: HOME | End: 2024-12-06
Attending: STUDENT IN AN ORGANIZED HEALTH CARE EDUCATION/TRAINING PROGRAM | Admitting: STUDENT IN AN ORGANIZED HEALTH CARE EDUCATION/TRAINING PROGRAM
Payer: MEDICARE

## 2024-12-06 VITALS
TEMPERATURE: 98 F | WEIGHT: 138.01 LBS | OXYGEN SATURATION: 97 % | HEART RATE: 77 BPM | DIASTOLIC BLOOD PRESSURE: 97 MMHG | SYSTOLIC BLOOD PRESSURE: 171 MMHG | HEIGHT: 67 IN | RESPIRATION RATE: 18 BRPM

## 2024-12-06 VITALS
TEMPERATURE: 97 F | RESPIRATION RATE: 18 BRPM | SYSTOLIC BLOOD PRESSURE: 153 MMHG | HEART RATE: 81 BPM | DIASTOLIC BLOOD PRESSURE: 85 MMHG | OXYGEN SATURATION: 95 %

## 2024-12-06 PROCEDURE — 70450 CT HEAD/BRAIN W/O DYE: CPT | Mod: 26,MC

## 2024-12-06 PROCEDURE — 99284 EMERGENCY DEPT VISIT MOD MDM: CPT

## 2024-12-06 PROCEDURE — 70450 CT HEAD/BRAIN W/O DYE: CPT | Mod: MC

## 2024-12-06 PROCEDURE — 99284 EMERGENCY DEPT VISIT MOD MDM: CPT | Mod: 25

## 2024-12-06 NOTE — ED PROVIDER NOTE - PHYSICAL EXAMINATION
general: Well appearing, in no acute distress  HEENT: Normocephalic, atraumatic, extraocular movements intact, no midline cervical spinal tenderness  CV: Regular rate  Pulm: No respiratory distress, no tachypnea  Abd: Flat, no gross distension  Ext: warm and well perfused  Skin: No gross rashes or lesions  Neuro: Alert and oriented, moving all extremities, cranial nerves II to XII intact, motor and sensation intact bilaterally

## 2024-12-06 NOTE — ED ADULT NURSE NOTE - ISAR SCORE
Reason for Disposition  • [1] MODERATE leg swelling (e.g., swelling extends up to knees) AND [2] new-onset or worsening    Protocols used: LEG SWELLING AND EDEMA-A-AH    
Patient's daughter, Nelida contacted  (permission to speak to Nelida documented in Epic). She states that she was with patient this morning and was saying that her leg hurts and she has some new swelling in her left leg from the ankle to the knee. She states that leg pain is not new for her. The swelling is new. She states that patient is not having any shortness of breath.      Per protocol, advised that patient be seen in an urgent care. She verbalized understanding and will let the patient know.   
Pt's daughter Nelida called stating her mom stated to her that her left ankle to her knee is swollen. Nelida want to speak with a nurse. Please advise call Nelida at [phone].  
0

## 2024-12-06 NOTE — ED PROVIDER NOTE - OBJECTIVE STATEMENT
74 year old M with history of afib on xerelto, hypothyroidism, HLD, HTN presenting after head trauma. Pt hit top of head on doorknob. Told to come here by his wife for CT. On xerelto, took it last night. No blurry vision, no nausea or vomiting, no numbness, weakness, tingling. ROS as above.

## 2024-12-06 NOTE — ED PROVIDER NOTE - CLINICAL SUMMARY MEDICAL DECISION MAKING FREE TEXT BOX
74-year-old with head injury on Xarelto, neuro intact, given age and AC use, will obtain CT head, reassess.

## 2024-12-06 NOTE — ED ADULT TRIAGE NOTE - CHIEF COMPLAINT QUOTE
Pt to ED c/o head strike to door today. Pt takes Xarelto. Ambulates with steady gait. Denies visual changes at this time.

## 2024-12-06 NOTE — ED PROVIDER NOTE - NSFOLLOWUPINSTRUCTIONS_ED_ALL_ED_FT
Please take tylenol as needed. Return for worsening symptoms, worsening headache, blurry vision, numbness, weakness, tingling. Otherwise, please follow up with your primary physician.

## 2024-12-06 NOTE — ED ADULT NURSE NOTE - CAS ELECT INFOMATION PROVIDED
MD reports pt can take more metoprolol,. had discussion with pt re htn, not emergent/DC instructions

## 2024-12-06 NOTE — ED PROVIDER NOTE - PATIENT PORTAL LINK FT
You can access the FollowMyHealth Patient Portal offered by [facility] by registering at the following website: [email or web address]. By joining [facility]’s FollowMyHealth portal, you will also be able to view your health information using other applications (apps) compatible with our system. No abnormalities

## 2024-12-06 NOTE — ED ADULT NURSE NOTE - OBJECTIVE STATEMENT
Presents for c/o head strike against door knob today, on eliquis last dose last night for afib. Denies loc/weakness/dizziness/vision changes.     On assessment- AOx4, breathing even and unlabored on RA, no apparent distress, VSS in triage, able to speak in clear coherent sentences, steady gait unassisted, neuro intact with no apparent facial asymmetry, PERRLA.
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
## 2024-12-10 DIAGNOSIS — E03.9 HYPOTHYROIDISM, UNSPECIFIED: ICD-10-CM

## 2024-12-10 DIAGNOSIS — I10 ESSENTIAL (PRIMARY) HYPERTENSION: ICD-10-CM

## 2024-12-10 DIAGNOSIS — S09.90XA UNSPECIFIED INJURY OF HEAD, INITIAL ENCOUNTER: ICD-10-CM

## 2024-12-10 DIAGNOSIS — Z88.6 ALLERGY STATUS TO ANALGESIC AGENT: ICD-10-CM

## 2024-12-10 DIAGNOSIS — Z79.01 LONG TERM (CURRENT) USE OF ANTICOAGULANTS: ICD-10-CM

## 2024-12-10 DIAGNOSIS — I48.91 UNSPECIFIED ATRIAL FIBRILLATION: ICD-10-CM

## 2024-12-10 DIAGNOSIS — Z88.8 ALLERGY STATUS TO OTHER DRUGS, MEDICAMENTS AND BIOLOGICAL SUBSTANCES: ICD-10-CM

## 2024-12-10 DIAGNOSIS — E78.5 HYPERLIPIDEMIA, UNSPECIFIED: ICD-10-CM
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
## 2025-05-06 ENCOUNTER — APPOINTMENT (OUTPATIENT)
Dept: PULMONOLOGY | Facility: CLINIC | Age: 75
End: 2025-05-06
Payer: MEDICARE

## 2025-05-06 VITALS
OXYGEN SATURATION: 97 % | HEART RATE: 77 BPM | BODY MASS INDEX: 21.97 KG/M2 | DIASTOLIC BLOOD PRESSURE: 77 MMHG | HEIGHT: 67 IN | TEMPERATURE: 97.8 F | WEIGHT: 140 LBS | SYSTOLIC BLOOD PRESSURE: 138 MMHG

## 2025-05-06 PROCEDURE — 99213 OFFICE O/P EST LOW 20 MIN: CPT

## 2025-05-06 RX ORDER — METHYLPREDNISOLONE 4 MG/1
4 TABLET ORAL
Qty: 1 | Refills: 0 | Status: ACTIVE | COMMUNITY
Start: 2025-05-06 | End: 1900-01-01